# Patient Record
Sex: FEMALE | URBAN - METROPOLITAN AREA
[De-identification: names, ages, dates, MRNs, and addresses within clinical notes are randomized per-mention and may not be internally consistent; named-entity substitution may affect disease eponyms.]

---

## 2022-04-11 ENCOUNTER — HISTORICAL (OUTPATIENT)
Dept: ADMINISTRATIVE | Facility: HOSPITAL | Age: 22
End: 2022-04-11

## 2022-04-29 VITALS
WEIGHT: 187.19 LBS | DIASTOLIC BLOOD PRESSURE: 55 MMHG | HEIGHT: 61 IN | SYSTOLIC BLOOD PRESSURE: 120 MMHG | BODY MASS INDEX: 35.34 KG/M2

## 2025-01-09 ENCOUNTER — HOSPITAL ENCOUNTER (EMERGENCY)
Facility: HOSPITAL | Age: 25
Discharge: HOME | End: 2025-01-09
Attending: EMERGENCY MEDICINE
Payer: MEDICAID

## 2025-01-09 VITALS
DIASTOLIC BLOOD PRESSURE: 95 MMHG | WEIGHT: 234.81 LBS | HEIGHT: 62 IN | RESPIRATION RATE: 18 BRPM | HEART RATE: 118 BPM | OXYGEN SATURATION: 96 % | TEMPERATURE: 98 F | SYSTOLIC BLOOD PRESSURE: 136 MMHG | BODY MASS INDEX: 43.21 KG/M2

## 2025-01-09 DIAGNOSIS — Z76.0 ENCOUNTER FOR MEDICATION REFILL: Primary | ICD-10-CM

## 2025-01-09 PROCEDURE — 99281 EMR DPT VST MAYX REQ PHY/QHP: CPT

## 2025-01-09 RX ORDER — GABAPENTIN 100 MG/1
100 CAPSULE ORAL 3 TIMES DAILY
Qty: 90 CAPSULE | Refills: 0 | Status: SHIPPED | OUTPATIENT
Start: 2025-01-09 | End: 2025-02-03 | Stop reason: SDUPTHER

## 2025-01-09 RX ORDER — QUETIAPINE FUMARATE 100 MG/1
400 TABLET, FILM COATED ORAL NIGHTLY
Qty: 120 TABLET | Refills: 0 | Status: SHIPPED | OUTPATIENT
Start: 2025-01-09 | End: 2025-02-03 | Stop reason: SDUPTHER

## 2025-01-09 RX ORDER — HYDROXYZINE PAMOATE 25 MG/1
25 CAPSULE ORAL EVERY 8 HOURS PRN
Qty: 30 CAPSULE | Refills: 0 | Status: SHIPPED | OUTPATIENT
Start: 2025-01-09 | End: 2025-02-03 | Stop reason: SDUPTHER

## 2025-01-09 RX ORDER — QUETIAPINE FUMARATE 100 MG/1
100 TABLET, FILM COATED ORAL EVERY MORNING
Qty: 30 TABLET | Refills: 0 | Status: SHIPPED | OUTPATIENT
Start: 2025-01-09 | End: 2025-02-08

## 2025-01-09 NOTE — DISCHARGE INSTRUCTIONS
Call internal medicine to schedule an appointment with a primary care provider.  Return if needed.

## 2025-01-09 NOTE — ED PROVIDER NOTES
Encounter Date: 1/9/2025       History     Chief Complaint   Patient presents with    Medication Refill     Recently moved back to [place] from [place], ran out of all meds a week ago. In need of a PCP and refill of medications until she can get to new dr here in [place]     24 year old female presents to the emergency department requesting medication refill.  She states she just moved back to [place] from [place].   Patient requesting referral for PCP and mental health resources.   She denies complaints.   Denies SI/HI.      The history is provided by the patient. No  was used.     Review of patient's allergies indicates:   Allergen Reactions    Palgic d [carbinoxamine-pseudoephedrine] Anaphylaxis    Topamax [topiramate] Anaphylaxis    Macrobid [nitrofurantoin monohyd/m-cryst] Nausea And Vomiting     No past medical history on file.  No past surgical history on file.  No family history on file.  Social History     Tobacco Use    Smoking status: Every Day     Types: Cigarettes, Vaping with nicotine    Smokeless tobacco: Never     Review of Systems   All other systems reviewed and are negative.      Physical Exam     Initial Vitals [01/09/25 1033]   BP Pulse Resp Temp SpO2   (!) 136/95 (!) 118 18 98.2 °F (36.8 °C) 96 %      MAP       --         Physical Exam    Nursing note and vitals reviewed.  Constitutional: She appears well-developed and well-nourished.   Cardiovascular:  Normal rate, regular rhythm, normal heart sounds and intact distal pulses.           Pulmonary/Chest: Breath sounds normal.   Abdominal: Abdomen is soft. Bowel sounds are normal.   Musculoskeletal:         General: Normal range of motion.     Neurological: She is alert.   Skin: Skin is warm.         ED Course   Procedures  Labs Reviewed - No data to display       Imaging Results    None          Medications - No data to display  Medical Decision Making  24 year old female presents to the emergency department  requesting medication refill.  She states she just moved back to [place] from [place].   Patient requesting referral for PCP and mental health resources.   She denies complaints.   Denies SI/HI.      Medication refilled.   Referral sent to internal medicine clinic and mental health resources provided.      Risk  Prescription drug management.               ED Course as of 01/09/25 1057   Thu Jan 09, 2025   1056 The patient is resting comfortably and in no acute distress.   Gave strict ED precautions, discussed specific conditions for return to the emergency department and importance of follow up with her primary care provider.  Patient voices understanding and agrees to the plan discussed. All patients' questions have been answered at this time.   She has remained hemodynamically stable throughout entire stay in ED and is stable for discharge home.  [ER]      ED Course User Index  [ER] Khushboo Bustillo PA                           Clinical Impression:  Final diagnoses:  [Z76.0] Encounter for medication refill (Primary)          ED Disposition Condition    Discharge Stable          ED Prescriptions       Medication Sig Dispense Start Date End Date Auth. Provider    gabapentin (NEURONTIN) 100 MG capsule Take 1 capsule (100 mg total) by mouth 3 (three) times daily. 90 capsule 1/9/2025 2/8/2025 Khushboo Bustillo PA    QUEtiapine (SEROQUEL) 100 MG Tab Take 4 tablets (400 mg total) by mouth every evening. 120 tablet 1/9/2025 2/8/2025 Khushboo Bustillo PA    QUEtiapine (SEROQUEL) 100 MG Tab Take 1 tablet (100 mg total) by mouth every morning. 30 tablet 1/9/2025 2/8/2025 Khushboo Bustillo PA          Follow-up Information       Follow up With Specialties Details Why Contact Info Additional Information    [facility] Emergency [facility] Emergency Medicine  As needed, If symptoms worsen [address] [facility] [address] [phone]     [facility] Internal Medicine Internal Medicine Call    [address] [address] [phone] Internal Medicine Clinic Entrance #1             Khushboo Bustillo PA  01/09/25 1058

## 2025-01-14 ENCOUNTER — OFFICE VISIT (OUTPATIENT)
Dept: INTERNAL MEDICINE | Facility: CLINIC | Age: 25
End: 2025-01-14
Payer: MEDICAID

## 2025-01-14 VITALS
HEART RATE: 101 BPM | WEIGHT: 241.69 LBS | SYSTOLIC BLOOD PRESSURE: 116 MMHG | RESPIRATION RATE: 16 BRPM | BODY MASS INDEX: 44.48 KG/M2 | TEMPERATURE: 98 F | HEIGHT: 62 IN | DIASTOLIC BLOOD PRESSURE: 78 MMHG | OXYGEN SATURATION: 97 %

## 2025-01-14 DIAGNOSIS — F31.9 BIPOLAR AFFECTIVE DISORDER, REMISSION STATUS UNSPECIFIED: ICD-10-CM

## 2025-01-14 DIAGNOSIS — Z76.0 ENCOUNTER FOR MEDICATION REFILL: ICD-10-CM

## 2025-01-14 DIAGNOSIS — Z12.4 CERVICAL CANCER SCREENING: ICD-10-CM

## 2025-01-14 DIAGNOSIS — E11.65 TYPE 2 DIABETES MELLITUS WITH HYPERGLYCEMIA, WITHOUT LONG-TERM CURRENT USE OF INSULIN: Primary | ICD-10-CM

## 2025-01-14 DIAGNOSIS — M79.641 RIGHT HAND PAIN: ICD-10-CM

## 2025-01-14 DIAGNOSIS — Z23 NEED FOR PROPHYLACTIC VACCINATION WITH UNSPECIFIED COMBINED VACCINE: ICD-10-CM

## 2025-01-14 DIAGNOSIS — F19.11 HISTORY OF SUBSTANCE ABUSE: ICD-10-CM

## 2025-01-14 DIAGNOSIS — Z00.00 WELL ADULT EXAM: ICD-10-CM

## 2025-01-14 PROBLEM — F19.10 SUBSTANCE ABUSE: Status: ACTIVE | Noted: 2025-01-14

## 2025-01-14 PROCEDURE — 90471 IMMUNIZATION ADMIN: CPT | Mod: PBBFAC

## 2025-01-14 PROCEDURE — 3078F DIAST BP <80 MM HG: CPT | Mod: CPTII,,,

## 2025-01-14 PROCEDURE — 3074F SYST BP LT 130 MM HG: CPT | Mod: CPTII,,,

## 2025-01-14 PROCEDURE — 90656 IIV3 VACC NO PRSV 0.5 ML IM: CPT | Mod: PBBFAC

## 2025-01-14 PROCEDURE — 90472 IMMUNIZATION ADMIN EACH ADD: CPT | Mod: PBBFAC

## 2025-01-14 PROCEDURE — 90677 PCV20 VACCINE IM: CPT | Mod: PBBFAC

## 2025-01-14 PROCEDURE — 1160F RVW MEDS BY RX/DR IN RCRD: CPT | Mod: CPTII,,,

## 2025-01-14 PROCEDURE — 99215 OFFICE O/P EST HI 40 MIN: CPT | Mod: PBBFAC

## 2025-01-14 PROCEDURE — 1159F MED LIST DOCD IN RCRD: CPT | Mod: CPTII,,,

## 2025-01-14 PROCEDURE — 99204 OFFICE O/P NEW MOD 45 MIN: CPT | Mod: S$PBB,,,

## 2025-01-14 PROCEDURE — 3008F BODY MASS INDEX DOCD: CPT | Mod: CPTII,,,

## 2025-01-14 RX ORDER — METFORMIN HYDROCHLORIDE 500 MG/1
500 TABLET ORAL 2 TIMES DAILY WITH MEALS
Qty: 180 TABLET | Refills: 2 | Status: SHIPPED | OUTPATIENT
Start: 2025-01-14

## 2025-01-14 RX ORDER — INSULIN PUMP SYRINGE, 3 ML
EACH MISCELLANEOUS
Qty: 1 EACH | Refills: 0 | Status: SHIPPED | OUTPATIENT
Start: 2025-01-14 | End: 2026-01-14

## 2025-01-14 RX ADMIN — PNEUMOCOCCAL 20-VALENT CONJUGATE VACCINE 0.5 ML
2.2; 2.2; 2.2; 2.2; 2.2; 2.2; 2.2; 2.2; 2.2; 2.2; 2.2; 2.2; 2.2; 2.2; 2.2; 2.2; 4.4; 2.2; 2.2; 2.2 INJECTION, SUSPENSION INTRAMUSCULAR at 07:01

## 2025-01-14 RX ADMIN — INFLUENZA VIRUS VACCINE 0.5 ML: 15; 15; 15 SUSPENSION INTRAMUSCULAR at 07:01

## 2025-01-14 NOTE — ASSESSMENT & PLAN NOTE
Resume metformin 500mg BID  Referral to nutrition services  A1C ordered  Follow ADA Diet. Avoid soda, simple sweets, and limit rice/pasta/breads/starches (no more than 45-50 grams per meal).  Maintain healthy weight with goal BMI <30.  Exercise 5 times per week for 30 minutes per day.  Stressed importance of daily foot exams.  Stressed importance of annual dilated eye exam.

## 2025-01-14 NOTE — ASSESSMENT & PLAN NOTE
Referral to [facility] for psychiatric management  Read positive daily meditations, avoid negative media, set healthy boundaries.  Exercise daily, keep consistent sleep pattern, eat a healthy diet.  Establish good social support, make changes to reduce stress.  Do not drink alcohol or use illicit drugs.  Reports any symptoms of suicidal/homicidal ideations or self harm immediately, go to nearest emergency room.

## 2025-01-14 NOTE — PROGRESS NOTES
SALVADOR Modi   [facility]  [address] [facility] [address]      PATIENT NAME: Hank Napier  : 2000  DATE: 23  MRN: [id number]      Patient PCP Information       Provider PCP Type    SALVADOR Modi [facility]            Reason for Visit / Chief Complaint: [facility] (Moved from [place], right hand pain (previous fracture to right wrist))       History of Present Illness / Problem Focused Workflow     Hank Napier presents to the clinic with [facility] (Moved from [place], right hand pain (previous fracture to right wrist))     23 yo WF presents to clinic. Medical problems DM, depression, anxiety, bipolar, substance abuse (meth, heroin, opiates), borderline personality disorder.     23  Pt presents to [facility] primary care. She recently moved back here from [place]. She was diagnosed with DM about 2 years ago, she was previously on metformin max dose but discontinued it due to nausea 2-3 months ago, she tolerated lower doses fine. Agreeable to resume metformin 500 BID for now. She is also looking for psychiatric provider, she was previously on suboxone, has been off for 3 months and is interested in resuming. She is reporting she has not had a period in years, 2 weeks ago she started with vaginal bleeding. She is complaining of right hand a wrist pain for about 4 years which she believes is from an old injury. Denies other complaints, reviewed plan of care with pt. She is agreeable to RTC 1 month with labs prior.           Review of Systems     Review of Systems   Constitutional:  Negative for fatigue, fever and unexpected weight change.   HENT:  Negative for ear pain, hearing loss, trouble swallowing and voice change.    Respiratory:  Negative for cough and shortness of breath.    Cardiovascular:  Negative for chest pain and palpitations.   Gastrointestinal:  Negative for abdominal pain, diarrhea and  "vomiting.   Genitourinary:  Negative for dysuria.   Musculoskeletal:  Positive for arthralgias. Negative for gait problem.   Skin:  Negative for rash and wound.   Neurological:  Negative for weakness.   Psychiatric/Behavioral:  Negative for suicidal ideas.          Medications and Allergies     Medications  Current Outpatient Medications   Medication Instructions    blood-glucose meter kit Use as instructed    gabapentin (NEURONTIN) 100 mg, Oral, 3 times daily    hydrOXYzine pamoate (VISTARIL) 25 mg, Oral, Every 8 hours PRN    metFORMIN (GLUCOPHAGE) 500 mg, Oral, 2 times daily with meals    QUEtiapine (SEROQUEL) 400 mg, Oral, Nightly    QUEtiapine (SEROQUEL) 100 mg, Oral, Every morning         Allergies  Review of patient's allergies indicates:   Allergen Reactions    Palgic d [carbinoxamine-pseudoephedrine] Anaphylaxis    Topamax [topiramate] Anaphylaxis    Macrobid [nitrofurantoin monohyd/m-cryst] Nausea And Vomiting       Physical Examination     Visit Vitals  /78 (BP Location: Right arm, Patient Position: Sitting)   Pulse 101   Temp 97.5 °F (36.4 °C) (Oral)   Resp 16   Ht 5' 2" (1.575 m)   Wt 109.6 kg (241 lb 11.2 oz)   LMP 01/08/2025 (Approximate)   SpO2 97%   BMI 44.21 kg/m²       Physical Exam  Vitals and nursing note reviewed.   Constitutional:       General: She is not in acute distress.     Appearance: She is not ill-appearing.   HENT:      Head: Normocephalic and atraumatic.      Mouth/Throat:      Mouth: Mucous membranes are moist.      Pharynx: Oropharynx is clear.   Eyes:      General: No scleral icterus.     Extraocular Movements: Extraocular movements intact.      Conjunctiva/sclera: Conjunctivae normal.      Pupils: Pupils are equal, round, and reactive to light.   Neck:      Vascular: No carotid bruit.   Cardiovascular:      Rate and Rhythm: Normal rate and regular rhythm.      Heart sounds: No murmur heard.     No friction rub. No gallop.   Pulmonary:      Effort: Pulmonary effort is normal. " No respiratory distress.      Breath sounds: Normal breath sounds. No wheezing, rhonchi or rales.   Abdominal:      General: Abdomen is flat. Bowel sounds are normal. There is no distension.      Palpations: Abdomen is soft. There is no mass.      Tenderness: There is no abdominal tenderness.   Musculoskeletal:         General: Normal range of motion.      Cervical back: Normal range of motion and neck supple.   Skin:     General: Skin is warm and dry.   Neurological:      General: No focal deficit present.      Mental Status: She is alert.   Psychiatric:         Mood and Affect: Mood normal.           Results     none      Assessment        ICD-10-CM ICD-9-CM   1. Type 2 diabetes mellitus with hyperglycemia, without long-term current use of insulin  E11.65 250.00     790.29   2. Bipolar affective disorder, remission status unspecified  F31.9 296.80   3. History of substance abuse  F19.11 305.93   4. Cervical cancer screening  Z12.4 V76.2   5. Right hand pain  M79.641 729.5   6. Need for prophylactic vaccination with unspecified combined vaccine  Z23 V06.9   7. Well adult exam  Z00.00 V70.0        Plan      Problem List Items Addressed This Visit       Right hand pain    Current Assessment & Plan     Xrays today  May take OTC ibuprofen PRN         Relevant Orders    X-Ray Hand 3 view Right    X-Ray Wrist 2 View Right    Substance abuse    Type 2 diabetes mellitus with hyperglycemia, without long-term current use of insulin - Primary    Current Assessment & Plan     Resume metformin 500mg BID  Referral to nutrition services  A1C ordered  Follow ADA Diet. Avoid soda, simple sweets, and limit rice/pasta/breads/starches (no more than 45-50 grams per meal).  Maintain healthy weight with goal BMI <30.  Exercise 5 times per week for 30 minutes per day.  Stressed importance of daily foot exams.  Stressed importance of annual dilated eye exam.           Relevant Medications    blood-glucose meter kit    metFORMIN  (GLUCOPHAGE) 500 MG tablet    Other Relevant Orders    Comprehensive Metabolic Panel    Hemoglobin A1C    Ambulatory referral/consult to Nutrition Services    Bipolar disorder    Current Assessment & Plan     Referral to [facility] for psychiatric management  Read positive daily meditations, avoid negative media, set healthy boundaries.  Exercise daily, keep consistent sleep pattern, eat a healthy diet.  Establish good social support, make changes to reduce stress.  Do not drink alcohol or use illicit drugs.  Reports any symptoms of suicidal/homicidal ideations or self harm immediately, go to nearest emergency room.           Relevant Orders    Ambulatory referral/consult to Psychiatry     Other Visit Diagnoses       Cervical cancer screening        Relevant Orders    Ambulatory referral/consult to Gynecology    Need for prophylactic vaccination with unspecified combined vaccine        Relevant Medications    pneumoc 20-lisa conj-dip cr(PF) (PREVNAR-20 (PF)) injection Syrg 0.5 mL (Completed) (Start on 1/14/2025  8:46 AM)    influenza (Flulaval, Fluzone, Fluarix) 45 mcg/0.5 mL IM vaccine (> or = 6 mo) 0.5 mL (Completed)    Well adult exam        Relevant Orders    CBC Auto Differential    Comprehensive Metabolic Panel    Hemoglobin A1C    Lipid Panel    TSH    T4, Free    Vitamin D    Chlamydia/GC, PCR    Hepatitis Panel, Acute    HIV 1/2 Ag/Ab (4th Gen)    SYPHILIS ANTIBODY (WITH REFLEX RPR)    Urinalysis, Reflex to Urine Culture            Future Appointments   Date Time Provider Department Center   2/11/2025  8:40 AM Rea Mcpherson, [facility]        Follow up in about 4 weeks (around 2/11/2025) for Wellness, With labwork prior to visit.      Signature:      [facility]  [address] [address]    Date of encounter: 1/14/25

## 2025-01-16 ENCOUNTER — TELEPHONE (OUTPATIENT)
Dept: INTERNAL MEDICINE | Facility: CLINIC | Age: 25
End: 2025-01-16
Payer: MEDICAID

## 2025-01-16 DIAGNOSIS — F19.10 SUBSTANCE ABUSE: ICD-10-CM

## 2025-01-16 DIAGNOSIS — F31.9 BIPOLAR AFFECTIVE DISORDER, REMISSION STATUS UNSPECIFIED: Primary | ICD-10-CM

## 2025-01-16 NOTE — TELEPHONE ENCOUNTER
----- Message from Nurse Gleason sent at 1/16/2025  9:05 AM CST -----  [facility] does not accept pt insurance  ----- Message -----  From: Misti Palacios  Sent: 1/15/2025   4:06 PM CST  To: [place] Lucia Staff    Who Called: [facility]    Caller is requesting assistance/information from provider's office.    Symptoms (please be specific):    How long has patient had these symptoms:    List of preferred pharmacies on file (remove unneeded): [unfilled]  If different, enter pharmacy into here including location and phone number:       Preferred Method of Contact: Phone Call  Patient's Preferred Phone Number on File: [phone]   Best Call Back Number, if different:  Additional Information: Office states they do not accept pt's insurance.

## 2025-01-16 NOTE — TELEPHONE ENCOUNTER
Info given to pt. When I spoke to her she said the gyno that we referred her to can't see her til November. She asked if there is someone else we can refer her to?

## 2025-01-16 NOTE — TELEPHONE ENCOUNTER
Please call pt and inform her of this and that I have referred her to another clinic called [facility].     Thanks

## 2025-02-03 DIAGNOSIS — M79.641 RIGHT HAND PAIN: Primary | ICD-10-CM

## 2025-02-03 DIAGNOSIS — F31.9 BIPOLAR AFFECTIVE DISORDER, REMISSION STATUS UNSPECIFIED: ICD-10-CM

## 2025-02-03 RX ORDER — GABAPENTIN 100 MG/1
100 CAPSULE ORAL 3 TIMES DAILY
Qty: 90 CAPSULE | Refills: 0 | Status: SHIPPED | OUTPATIENT
Start: 2025-02-03 | End: 2025-03-05

## 2025-02-03 RX ORDER — HYDROXYZINE PAMOATE 25 MG/1
25 CAPSULE ORAL EVERY 8 HOURS PRN
Qty: 30 CAPSULE | Refills: 0 | Status: SHIPPED | OUTPATIENT
Start: 2025-02-03

## 2025-02-03 RX ORDER — QUETIAPINE FUMARATE 100 MG/1
400 TABLET, FILM COATED ORAL NIGHTLY
Qty: 120 TABLET | Refills: 0 | Status: SHIPPED | OUTPATIENT
Start: 2025-02-03 | End: 2025-03-05

## 2025-02-03 NOTE — TELEPHONE ENCOUNTER
----- Message from Amanda sent at 2/3/2025 12:40 PM CST -----  Who Called: Hank Napier    Refill or New Rx:Refill  RX Name and Strength:QUEtiapine (SEROQUEL) 100 MG Tab   How is the patient currently taking it? (ex. 1XDay):2 xday 100mg @ day and 400mg @ night  Is this a 30 day or 90 day RX:120 tablet    RX Name and Strength:gabapentin (NEURONTIN) 100 MG capsule   How is the patient currently taking it? (ex. 1XDay):3 xday  Is this a 30 day or 90 day RX:90 capsule    RX Name and Strength:hydrOXYzine pamoate (VISTARIL) 25 MG Cap   How is the patient currently taking it? (ex. 1XDay):1 xday as needed  Is this a 30 day or 90 day RX:30 capsule    Local or Mail Order:local  List of preferred pharmacies on file (remove unneeded): [unfilled]  If different Pharmacy is requested, enter Pharmacy information here including location and phone number: [facility]/pharmacy #[id number] - [facility] [address] AT [address] [facility]   Phone: [phone]  Fax: [phone]    Ordering Provider:miranda      Preferred Method of Contact: Phone Call  Patient's Preferred Phone Number on File: [phone]   Best Call Back Number, if different:  Additional Information: refill request, pt called and stated pharmacy needs approval to refill, please advise. thanks

## 2025-02-10 ENCOUNTER — LAB VISIT (OUTPATIENT)
Dept: LAB | Facility: HOSPITAL | Age: 25
End: 2025-02-10
Payer: MEDICAID

## 2025-02-10 DIAGNOSIS — Z00.00 WELL ADULT EXAM: ICD-10-CM

## 2025-02-10 DIAGNOSIS — E11.65 TYPE 2 DIABETES MELLITUS WITH HYPERGLYCEMIA, WITHOUT LONG-TERM CURRENT USE OF INSULIN: ICD-10-CM

## 2025-02-10 LAB
25(OH)D3+25(OH)D2 SERPL-MCNC: 22 NG/ML (ref 30–80)
ALBUMIN SERPL-MCNC: 3.9 G/DL (ref 3.5–5)
ALBUMIN/GLOB SERPL: 1.1 RATIO (ref 1.1–2)
ALP SERPL-CCNC: 64 UNIT/L (ref 40–150)
ALT SERPL-CCNC: 30 UNIT/L (ref 0–55)
ANION GAP SERPL CALC-SCNC: 7 MEQ/L
AST SERPL-CCNC: 19 UNIT/L (ref 5–34)
BACTERIA #/AREA URNS AUTO: ABNORMAL /HPF
BASOPHILS # BLD AUTO: 0.03 X10(3)/MCL
BASOPHILS NFR BLD AUTO: 0.3 %
BILIRUB SERPL-MCNC: 0.3 MG/DL
BILIRUB UR QL STRIP.AUTO: NEGATIVE
BUN SERPL-MCNC: 10.8 MG/DL (ref 7–18.7)
C TRACH DNA SPEC QL NAA+PROBE: NOT DETECTED
CALCIUM SERPL-MCNC: 10 MG/DL (ref 8.4–10.2)
CHLORIDE SERPL-SCNC: 109 MMOL/L (ref 98–107)
CHOLEST SERPL-MCNC: 138 MG/DL
CHOLEST/HDLC SERPL: 5 {RATIO} (ref 0–5)
CLARITY UR: CLEAR
CO2 SERPL-SCNC: 24 MMOL/L (ref 22–29)
COLOR UR AUTO: YELLOW
CREAT SERPL-MCNC: 0.64 MG/DL (ref 0.55–1.02)
CREAT/UREA NIT SERPL: 17
EOSINOPHIL # BLD AUTO: 0.23 X10(3)/MCL (ref 0–0.9)
EOSINOPHIL NFR BLD AUTO: 2.6 %
ERYTHROCYTE [DISTWIDTH] IN BLOOD BY AUTOMATED COUNT: 12.6 % (ref 11.5–17)
EST. AVERAGE GLUCOSE BLD GHB EST-MCNC: 128.4 MG/DL
GFR SERPLBLD CREATININE-BSD FMLA CKD-EPI: >60 ML/MIN/1.73/M2
GLOBULIN SER-MCNC: 3.7 GM/DL (ref 2.4–3.5)
GLUCOSE SERPL-MCNC: 116 MG/DL (ref 74–100)
GLUCOSE UR QL STRIP: NORMAL
HAV IGM SERPL QL IA: NONREACTIVE
HBA1C MFR BLD: 6.1 %
HBV CORE IGM SERPL QL IA: NONREACTIVE
HBV SURFACE AG SERPL QL IA: NONREACTIVE
HCT VFR BLD AUTO: 39.6 % (ref 37–47)
HCV AB SERPL QL IA: NONREACTIVE
HDLC SERPL-MCNC: 30 MG/DL (ref 35–60)
HGB BLD-MCNC: 12.9 G/DL (ref 12–16)
HGB UR QL STRIP: NEGATIVE
HIV 1+2 AB+HIV1 P24 AG SERPL QL IA: NONREACTIVE
HYALINE CASTS #/AREA URNS LPF: ABNORMAL /LPF
IMM GRANULOCYTES # BLD AUTO: 0.03 X10(3)/MCL (ref 0–0.04)
IMM GRANULOCYTES NFR BLD AUTO: 0.3 %
KETONES UR QL STRIP: ABNORMAL
LDLC SERPL CALC-MCNC: 89 MG/DL (ref 50–140)
LEUKOCYTE ESTERASE UR QL STRIP: 250
LYMPHOCYTES # BLD AUTO: 3.52 X10(3)/MCL (ref 0.6–4.6)
LYMPHOCYTES NFR BLD AUTO: 39.4 %
MCH RBC QN AUTO: 29.2 PG (ref 27–31)
MCHC RBC AUTO-ENTMCNC: 32.6 G/DL (ref 33–36)
MCV RBC AUTO: 89.6 FL (ref 80–94)
MONOCYTES # BLD AUTO: 0.46 X10(3)/MCL (ref 0.1–1.3)
MONOCYTES NFR BLD AUTO: 5.1 %
MUCOUS THREADS URNS QL MICRO: ABNORMAL /LPF
N GONORRHOEA DNA SPEC QL NAA+PROBE: NOT DETECTED
NEUTROPHILS # BLD AUTO: 4.67 X10(3)/MCL (ref 2.1–9.2)
NEUTROPHILS NFR BLD AUTO: 52.3 %
NITRITE UR QL STRIP: NEGATIVE
NRBC BLD AUTO-RTO: 0 %
PH UR STRIP: 7 [PH]
PLATELET # BLD AUTO: 294 X10(3)/MCL (ref 130–400)
PMV BLD AUTO: 9.3 FL (ref 7.4–10.4)
POTASSIUM SERPL-SCNC: 4.1 MMOL/L (ref 3.5–5.1)
PROT SERPL-MCNC: 7.6 GM/DL (ref 6.4–8.3)
PROT UR QL STRIP: ABNORMAL
RBC # BLD AUTO: 4.42 X10(6)/MCL (ref 4.2–5.4)
RBC #/AREA URNS AUTO: ABNORMAL /HPF
SODIUM SERPL-SCNC: 140 MMOL/L (ref 136–145)
SOURCE (OHS): NORMAL
SP GR UR STRIP.AUTO: 1.03 (ref 1–1.03)
SQUAMOUS #/AREA URNS LPF: ABNORMAL /HPF
T PALLIDUM AB SER QL: NONREACTIVE
T4 FREE SERPL-MCNC: 0.88 NG/DL (ref 0.7–1.48)
TRIGL SERPL-MCNC: 97 MG/DL (ref 37–140)
TSH SERPL-ACNC: 0.56 UIU/ML (ref 0.35–4.94)
UROBILINOGEN UR STRIP-ACNC: ABNORMAL
VLDLC SERPL CALC-MCNC: 19 MG/DL
WBC # BLD AUTO: 8.94 X10(3)/MCL (ref 4.5–11.5)
WBC #/AREA URNS AUTO: ABNORMAL /HPF

## 2025-02-10 PROCEDURE — 80053 COMPREHEN METABOLIC PANEL: CPT

## 2025-02-10 PROCEDURE — 85025 COMPLETE CBC W/AUTO DIFF WBC: CPT

## 2025-02-10 PROCEDURE — 84443 ASSAY THYROID STIM HORMONE: CPT

## 2025-02-10 PROCEDURE — 87491 CHLMYD TRACH DNA AMP PROBE: CPT

## 2025-02-10 PROCEDURE — 80074 ACUTE HEPATITIS PANEL: CPT

## 2025-02-10 PROCEDURE — 83036 HEMOGLOBIN GLYCOSYLATED A1C: CPT

## 2025-02-10 PROCEDURE — 81015 MICROSCOPIC EXAM OF URINE: CPT

## 2025-02-10 PROCEDURE — 84439 ASSAY OF FREE THYROXINE: CPT

## 2025-02-10 PROCEDURE — 86780 TREPONEMA PALLIDUM: CPT

## 2025-02-10 PROCEDURE — 82306 VITAMIN D 25 HYDROXY: CPT

## 2025-02-10 PROCEDURE — 36415 COLL VENOUS BLD VENIPUNCTURE: CPT

## 2025-02-10 PROCEDURE — 87389 HIV-1 AG W/HIV-1&-2 AB AG IA: CPT

## 2025-02-10 PROCEDURE — 80061 LIPID PANEL: CPT

## 2025-02-10 NOTE — PROGRESS NOTES
Labs reviewed, will discuss results with patient at their upcoming appointment.     SALVADOR Andersen

## 2025-02-11 ENCOUNTER — OFFICE VISIT (OUTPATIENT)
Dept: INTERNAL MEDICINE | Facility: CLINIC | Age: 25
End: 2025-02-11
Payer: MEDICAID

## 2025-02-11 VITALS
RESPIRATION RATE: 16 BRPM | BODY MASS INDEX: 44.01 KG/M2 | OXYGEN SATURATION: 98 % | SYSTOLIC BLOOD PRESSURE: 126 MMHG | WEIGHT: 239.19 LBS | TEMPERATURE: 98 F | HEIGHT: 62 IN | HEART RATE: 88 BPM | DIASTOLIC BLOOD PRESSURE: 84 MMHG

## 2025-02-11 DIAGNOSIS — E11.65 TYPE 2 DIABETES MELLITUS WITH HYPERGLYCEMIA, WITHOUT LONG-TERM CURRENT USE OF INSULIN: ICD-10-CM

## 2025-02-11 DIAGNOSIS — E55.9 VITAMIN D DEFICIENCY: ICD-10-CM

## 2025-02-11 DIAGNOSIS — G43.109 MIGRAINE WITH AURA AND WITHOUT STATUS MIGRAINOSUS, NOT INTRACTABLE: ICD-10-CM

## 2025-02-11 DIAGNOSIS — R80.9 PROTEINURIA, UNSPECIFIED TYPE: ICD-10-CM

## 2025-02-11 DIAGNOSIS — Z00.00 WELL ADULT EXAM: Primary | ICD-10-CM

## 2025-02-11 PROCEDURE — 3074F SYST BP LT 130 MM HG: CPT | Mod: CPTII,,,

## 2025-02-11 PROCEDURE — 1160F RVW MEDS BY RX/DR IN RCRD: CPT | Mod: CPTII,,,

## 2025-02-11 PROCEDURE — 99214 OFFICE O/P EST MOD 30 MIN: CPT | Mod: 25,S$PBB,,

## 2025-02-11 PROCEDURE — 3044F HG A1C LEVEL LT 7.0%: CPT | Mod: CPTII,,,

## 2025-02-11 PROCEDURE — 99395 PREV VISIT EST AGE 18-39: CPT | Mod: S$PBB,,,

## 2025-02-11 PROCEDURE — 1159F MED LIST DOCD IN RCRD: CPT | Mod: CPTII,,,

## 2025-02-11 PROCEDURE — 3008F BODY MASS INDEX DOCD: CPT | Mod: CPTII,,,

## 2025-02-11 PROCEDURE — 3079F DIAST BP 80-89 MM HG: CPT | Mod: CPTII,,,

## 2025-02-11 PROCEDURE — 99214 OFFICE O/P EST MOD 30 MIN: CPT | Mod: PBBFAC

## 2025-02-11 RX ORDER — SUMATRIPTAN SUCCINATE 25 MG/1
50 TABLET ORAL DAILY PRN
Qty: 10 TABLET | Refills: 0 | Status: SHIPPED | OUTPATIENT
Start: 2025-02-11 | End: 2025-03-13

## 2025-02-11 NOTE — ASSESSMENT & PLAN NOTE
Educated on increasing foods high in Vitamin D such as fish oil, cod liver oil, salmon, milk fortified with vitamin D.  RX Vitamin D3 02780 IU weekly x 12 weeks.  Complete entire 12 weeks of Vitamin D prescription.  After completion of prescription (12 weeks/3 months), begin taking Vitamin D 2000 I.U. tablets daily (purchase over the counter).  Repeat Vitamin D level as ordered.    Lab Results   Component Value Date    [id number] 22 (L) 02/10/2025

## 2025-02-11 NOTE — PROGRESS NOTES
Rea Mcpherson, SALVADOR   [facility]  [address] [facility] [address]      PATIENT NAME: Hank Napier  : 2000  DATE: 25  MRN: [id number]      Reason for Visit / Chief Complaint: Follow-up (Labs completed, migraine )       History of Present Illness / Problem Focused Workflow     Hank Napier presents to the clinic with Follow-up (Labs completed, migraine )     25 yo WF presents to clinic. Medical problems DM, depression, anxiety, bipolar, substance abuse (meth, heroin, opiates), borderline personality disorder. Established with [facility].     25  Pt presents to establish primary care. She recently moved back here from [place]. She was diagnosed with DM about 2 years ago, she was previously on metformin max dose but discontinued it due to nausea 2-3 months ago, she tolerated lower doses fine. Agreeable to resume metformin 500 BID for now. She is also looking for psychiatric provider, she was previously on suboxone, has been off for 3 months and is interested in resuming. She is reporting she has not had a period in years, 2 weeks ago she started with vaginal bleeding. She is complaining of right hand a wrist pain for about 4 years which she believes is from an old injury. Denies other complaints, reviewed plan of care with pt. She is agreeable to RTC 1 month with labs prior.     25  Pt presents for wellness and lab review. A1C at goal 6.1. she reports she has been compliance with metformin 500mg BID. Vitamin D deficiency noted, she's agreeable to rx followed by OTC supplement. Reviewed protein present in urine, reinforced hydration and continued glycemic control. Will repeat in 6 months. She is complaining of migraine for the last few days, she has been on topamax in the past, unsure if she has tried other meds. Agreeable to try sumatriptan PRN. RTC in 6 months for DM and proteinuria f/u with labs or  PRN          Review of Systems     Review of Systems   Constitutional:  Negative for fatigue, fever and unexpected weight change.   HENT:  Negative for ear pain, hearing loss, trouble swallowing and voice change.    Respiratory:  Negative for cough and shortness of breath.    Cardiovascular:  Negative for chest pain and palpitations.   Gastrointestinal:  Negative for abdominal pain, diarrhea and vomiting.   Genitourinary:  Negative for dysuria.   Musculoskeletal:  Negative for gait problem.   Skin:  Negative for rash and wound.   Neurological:  Positive for headaches. Negative for weakness.   Psychiatric/Behavioral:  Negative for suicidal ideas.          Medications and Allergies     Medications  Medication List with Changes/Refills   New Medications    SUMATRIPTAN (IMITREX) 25 MG TAB    Take 2 tablets (50 mg total) by mouth daily as needed (migraine).   Current Medications    BLOOD-GLUCOSE METER KIT    Use as instructed    GABAPENTIN (NEURONTIN) 100 MG CAPSULE    Take 1 capsule (100 mg total) by mouth 3 (three) times daily.    HYDROXYZINE PAMOATE (VISTARIL) 25 MG CAP    Take 1 capsule (25 mg total) by mouth every 8 (eight) hours as needed (anxiety).    METFORMIN (GLUCOPHAGE) 500 MG TABLET    Take 1 tablet (500 mg total) by mouth 2 (two) times daily with meals.    QUETIAPINE (SEROQUEL) 100 MG TAB    Take 1 tablet (100 mg total) by mouth every morning.    QUETIAPINE (SEROQUEL) 100 MG TAB    Take 4 tablets (400 mg total) by mouth every evening.         Allergies  Review of patient's allergies indicates:   Allergen Reactions    Palgic d [carbinoxamine-pseudoephedrine] Anaphylaxis    Topamax [topiramate] Anaphylaxis    Macrobid [nitrofurantoin monohyd/m-cryst] Nausea And Vomiting       Physical Examination     Vitals:    02/11/25 0851   BP: 126/84   Pulse: 88   Resp: 16   Temp: 97.9 °F (36.6 °C)     Physical Exam  Vitals and nursing note reviewed.   Constitutional:       General: She is not in acute distress.      Appearance: She is not ill-appearing.   HENT:      Head: Normocephalic and atraumatic.      Mouth/Throat:      Mouth: Mucous membranes are moist.      Pharynx: Oropharynx is clear.   Eyes:      General: No scleral icterus.     Extraocular Movements: Extraocular movements intact.      Conjunctiva/sclera: Conjunctivae normal.      Pupils: Pupils are equal, round, and reactive to light.   Neck:      Vascular: No carotid bruit.   Cardiovascular:      Rate and Rhythm: Normal rate and regular rhythm.      Heart sounds: No murmur heard.     No friction rub. No gallop.   Pulmonary:      Effort: Pulmonary effort is normal. No respiratory distress.      Breath sounds: Normal breath sounds. No wheezing, rhonchi or rales.   Abdominal:      General: Abdomen is flat. Bowel sounds are normal. There is no distension.      Palpations: Abdomen is soft. There is no mass.      Tenderness: There is no abdominal tenderness.   Musculoskeletal:         General: Normal range of motion.      Cervical back: Normal range of motion and neck supple.   Skin:     General: Skin is warm and dry.   Neurological:      General: No focal deficit present.      Mental Status: She is alert.   Psychiatric:         Mood and Affect: Mood normal.           Results     Lab Results   Component Value Date    WBC 8.94 02/10/2025    RBC 4.42 02/10/2025    HGB 12.9 02/10/2025    HCT 39.6 02/10/2025    MCV 89.6 02/10/2025    MCH 29.2 02/10/2025    MCHC 32.6 (L) 02/10/2025    RDW 12.6 02/10/2025     02/10/2025    MPV 9.3 02/10/2025     Sodium   Date Value Ref Range Status   02/10/2025 140 136 - 145 mmol/L Final     Potassium   Date Value Ref Range Status   02/10/2025 4.1 3.5 - 5.1 mmol/L Final     Chloride   Date Value Ref Range Status   02/10/2025 109 (H) 98 - 107 mmol/L Final     CO2   Date Value Ref Range Status   02/10/2025 24 22 - 29 mmol/L Final     Blood Urea Nitrogen   Date Value Ref Range Status   02/10/2025 10.8 7.0 - 18.7 mg/dL Final     Creatinine    Date Value Ref Range Status   02/10/2025 0.64 0.55 - 1.02 mg/dL Final     Calcium   Date Value Ref Range Status   02/10/2025 10.0 8.4 - 10.2 mg/dL Final     Albumin   Date Value Ref Range Status   02/10/2025 3.9 3.5 - 5.0 g/dL Final     Bilirubin Total   Date Value Ref Range Status   02/10/2025 0.3 <=1.5 mg/dL Final     ALP   Date Value Ref Range Status   02/10/2025 64 40 - 150 unit/L Final     AST   Date Value Ref Range Status   02/10/2025 19 5 - 34 unit/L Final     ALT   Date Value Ref Range Status   02/10/2025 30 0 - 55 unit/L Final     Estimated GFR-Non    Date Value Ref Range Status   11/14/2017 >60 mL/min/1.73 m2 Final     Lab Results   Component Value Date    CHOL 138 02/10/2025     Lab Results   Component Value Date    HDL 30 (L) 02/10/2025     Lab Results   Component Value Date    TRIG 97 02/10/2025     Lab Results   Component Value Date    VLDL 19 02/10/2025     Lab Results   Component Value Date    LDL 89.00 02/10/2025     Lab Results   Component Value Date    TSH 0.555 02/10/2025     Lab Results   Component Value Date    PHUR 7.0 02/10/2025    PROTEINUA 1+ (A) 02/10/2025    GLUCUA Normal 02/10/2025    KETONESU Negative 11/14/2017    OCCULTUA Negative 02/10/2025    NITRITE Negative 02/10/2025    LEUKOCYTESUR 250 (A) 02/10/2025     Lab Results   Component Value Date    HGBA1C 6.1 02/10/2025           Assessment         ICD-10-CM ICD-9-CM   1. Well adult exam  Z00.00 V70.0   2. Type 2 diabetes mellitus with hyperglycemia, without long-term current use of insulin  E11.65 250.00     790.29   3. Vitamin D deficiency  E55.9 268.9   4. Proteinuria, unspecified type  R80.9 791.0   5. Migraine with aura and without status migrainosus, not intractable  G43.109 346.00       Plan      Problem List Items Addressed This Visit       Type 2 diabetes mellitus with hyperglycemia, without long-term current use of insulin    Current Assessment & Plan     Continue metformin 500mg BID  A1C 6.1- at  goal  Follow ADA Diet. Avoid soda, simple sweets, and limit rice/pasta/breads/starches (no more than 45-50 grams per meal).  Maintain healthy weight with goal BMI <30.  Exercise 5 times per week for 30 minutes per day.  Stressed importance of daily foot exams.  Stressed importance of annual dilated eye exam.           Relevant Orders    Hemoglobin A1C    Comprehensive Metabolic Panel    Microalbumin/Creatinine Ratio, Urine    Urinalysis, Reflex to Urine Culture    Vitamin D deficiency    Current Assessment & Plan     Educated on increasing foods high in Vitamin D such as fish oil, cod liver oil, salmon, milk fortified with vitamin D.  RX Vitamin D3 32894 IU weekly x 12 weeks.  Complete entire 12 weeks of Vitamin D prescription.  After completion of prescription (12 weeks/3 months), begin taking Vitamin D 2000 I.U. tablets daily (purchase over the counter).  Repeat Vitamin D level as ordered.    Lab Results   Component Value Date    [id number] 22 (L) 02/10/2025              Relevant Orders    Vitamin D    Proteinuria    Current Assessment & Plan     Educated on renal protective measures  Will repeat in 6 months  Will consider low dose ACEI if persistent         Relevant Orders    Microalbumin/Creatinine Ratio, Urine    Urinalysis, Reflex to Urine Culture    Migraine with aura and without status migrainosus, not intractable    Current Assessment & Plan     Sumatriptan PRN  Avoid triggers such as bright lights, alcohol, nicotine, aged cheeses, chocolate, caffeine, foods/drinks that contain nitrates or aspartame.  Take meds as prescribed.  Lie in a quiet, dark room if possible.  Limit stress and get at least 8 hours of sleep per night.            Relevant Medications    sumatriptan (IMITREX) 25 MG Tab    Well adult exam - Primary    Current Assessment & Plan     Pt wellness visit completed today with appropriate lab work.    Topics Reviewed / Updated- scheduled for pap Nov 2025  Immunizations Discussed  Dicussed  Healthy Diet &   Encouraged to exercise 3 x weekly  Increase Water Intake  Eat more fruits and vegetables  Avoid soda & alcohol              Future Appointments   Date Time Provider Department Center   4/2/2025  9:00 AM DIETITIAN, EBER NUTRITION [facility]   9/25/2025  2:40 PM Rea Mcpherson FNP [facility]   11/3/2025  9:00 AM RESIDENTS, [facility]            Signature:      [facility]  [address] [facility] [address]    Date of encounter: 2/11/25

## 2025-02-11 NOTE — ASSESSMENT & PLAN NOTE
Educated on renal protective measures  Will repeat in 6 months  Will consider low dose ACEI if persistent

## 2025-02-11 NOTE — ASSESSMENT & PLAN NOTE
Continue metformin 500mg BID  A1C 6.1- at goal  Follow ADA Diet. Avoid soda, simple sweets, and limit rice/pasta/breads/starches (no more than 45-50 grams per meal).  Maintain healthy weight with goal BMI <30.  Exercise 5 times per week for 30 minutes per day.  Stressed importance of daily foot exams.  Stressed importance of annual dilated eye exam.

## 2025-02-11 NOTE — ASSESSMENT & PLAN NOTE
Pt wellness visit completed today with appropriate lab work.   HM Topics Reviewed / Updated- scheduled for pap Nov 2025  Immunizations Discussed  Dicussed Healthy Diet &   Encouraged to exercise 3 x weekly  Increase Water Intake  Eat more fruits and vegetables  Avoid soda & alcohol

## 2025-02-11 NOTE — ASSESSMENT & PLAN NOTE
Sumatriptan PRN  Avoid triggers such as bright lights, alcohol, nicotine, aged cheeses, chocolate, caffeine, foods/drinks that contain nitrates or aspartame.  Take meds as prescribed.  Lie in a quiet, dark room if possible.  Limit stress and get at least 8 hours of sleep per night.

## 2025-02-13 ENCOUNTER — TELEPHONE (OUTPATIENT)
Dept: INTERNAL MEDICINE | Facility: CLINIC | Age: 25
End: 2025-02-13
Payer: MEDICAID

## 2025-02-13 NOTE — TELEPHONE ENCOUNTER
----- Message from Veronica Goins sent at 2/13/2025  1:57 PM CST -----  Who Called: Hank Napier    Patient is returning phone call    Who Left Message for Patient:N/A  Does the patient know what this is regarding?:N/A      Preferred Method of Contact: Phone Call  Patient's Preferred Phone Number on File: [phone]   Best Call Back Number, if different:  Additional Information: Pt stated she was on the line w/ clinic and call got dropped

## 2025-02-20 ENCOUNTER — HOSPITAL ENCOUNTER (EMERGENCY)
Facility: HOSPITAL | Age: 25
Discharge: HOME | End: 2025-02-20
Attending: EMERGENCY MEDICINE
Payer: MEDICAID

## 2025-02-20 VITALS
HEIGHT: 62 IN | WEIGHT: 225 LBS | DIASTOLIC BLOOD PRESSURE: 91 MMHG | RESPIRATION RATE: 16 BRPM | OXYGEN SATURATION: 97 % | SYSTOLIC BLOOD PRESSURE: 125 MMHG | BODY MASS INDEX: 41.41 KG/M2 | TEMPERATURE: 99 F | HEART RATE: 98 BPM

## 2025-02-20 DIAGNOSIS — Z00.8 MEDICAL CLEARANCE FOR PSYCHIATRIC ADMISSION: Primary | ICD-10-CM

## 2025-02-20 DIAGNOSIS — F15.10 AMPHETAMINE ABUSE: ICD-10-CM

## 2025-02-20 LAB
ACCEPTIBLE SP GR UR QL: 1.02 (ref 1–1.03)
ALBUMIN SERPL-MCNC: 4.3 G/DL (ref 3.5–5)
ALBUMIN/GLOB SERPL: 1 RATIO (ref 1.1–2)
ALP SERPL-CCNC: 72 UNIT/L (ref 40–150)
ALT SERPL-CCNC: 55 UNIT/L (ref 0–55)
AMPHET UR QL SCN: POSITIVE
ANION GAP SERPL CALC-SCNC: 8 MEQ/L
APAP SERPL-MCNC: <3 UG/ML (ref 10–30)
AST SERPL-CCNC: 51 UNIT/L (ref 5–34)
B-HCG UR QL: NEGATIVE
BACTERIA #/AREA URNS AUTO: ABNORMAL /HPF
BARBITURATE SCN PRESENT UR: NEGATIVE
BASOPHILS # BLD AUTO: 0.04 X10(3)/MCL
BASOPHILS NFR BLD AUTO: 0.4 %
BENZODIAZ UR QL SCN: NEGATIVE
BILIRUB SERPL-MCNC: 1 MG/DL
BILIRUB UR QL STRIP.AUTO: ABNORMAL
BUN SERPL-MCNC: 16 MG/DL (ref 7–18.7)
CALCIUM SERPL-MCNC: 9.9 MG/DL (ref 8.4–10.2)
CANNABINOIDS UR QL SCN: POSITIVE
CHLORIDE SERPL-SCNC: 104 MMOL/L (ref 98–107)
CLARITY UR: CLEAR
CO2 SERPL-SCNC: 26 MMOL/L (ref 22–29)
COCAINE UR QL SCN: NEGATIVE
COLOR UR AUTO: ABNORMAL
CREAT SERPL-MCNC: 0.72 MG/DL (ref 0.55–1.02)
CREAT/UREA NIT SERPL: 22
EOSINOPHIL # BLD AUTO: 0.08 X10(3)/MCL (ref 0–0.9)
EOSINOPHIL NFR BLD AUTO: 0.8 %
ERYTHROCYTE [DISTWIDTH] IN BLOOD BY AUTOMATED COUNT: 12.1 % (ref 11.5–17)
ETHANOL SERPL-MCNC: <10 MG/DL
FENTANYL UR QL SCN: NEGATIVE
GFR SERPLBLD CREATININE-BSD FMLA CKD-EPI: >60 ML/MIN/1.73/M2
GLOBULIN SER-MCNC: 4.2 GM/DL (ref 2.4–3.5)
GLUCOSE SERPL-MCNC: 97 MG/DL (ref 74–100)
GLUCOSE UR QL STRIP: NEGATIVE
HCT VFR BLD AUTO: 38.7 % (ref 37–47)
HGB BLD-MCNC: 13.1 G/DL (ref 12–16)
HGB UR QL STRIP: NEGATIVE
IMM GRANULOCYTES # BLD AUTO: 0.01 X10(3)/MCL (ref 0–0.04)
IMM GRANULOCYTES NFR BLD AUTO: 0.1 %
KETONES UR QL STRIP: >=80
LEUKOCYTE ESTERASE UR QL STRIP: ABNORMAL
LYMPHOCYTES # BLD AUTO: 3.77 X10(3)/MCL (ref 0.6–4.6)
LYMPHOCYTES NFR BLD AUTO: 37.1 %
MCH RBC QN AUTO: 29.6 PG (ref 27–31)
MCHC RBC AUTO-ENTMCNC: 33.9 G/DL (ref 33–36)
MCV RBC AUTO: 87.6 FL (ref 80–94)
MONOCYTES # BLD AUTO: 0.7 X10(3)/MCL (ref 0.1–1.3)
MONOCYTES NFR BLD AUTO: 6.9 %
NEUTROPHILS # BLD AUTO: 5.56 X10(3)/MCL (ref 2.1–9.2)
NEUTROPHILS NFR BLD AUTO: 54.7 %
NITRITE UR QL STRIP: NEGATIVE
OPIATES UR QL SCN: NEGATIVE
PCP UR QL: NEGATIVE
PH UR STRIP: 6 [PH]
PH UR: 6 [PH] (ref 3–11)
PLATELET # BLD AUTO: 293 X10(3)/MCL (ref 130–400)
PMV BLD AUTO: 9.4 FL (ref 7.4–10.4)
POTASSIUM SERPL-SCNC: 3.5 MMOL/L (ref 3.5–5.1)
PROT SERPL-MCNC: 8.5 GM/DL (ref 6.4–8.3)
PROT UR QL STRIP: 30
RBC # BLD AUTO: 4.42 X10(6)/MCL (ref 4.2–5.4)
RBC #/AREA URNS AUTO: ABNORMAL /HPF
SODIUM SERPL-SCNC: 138 MMOL/L (ref 136–145)
SP GR UR STRIP.AUTO: 1.02 (ref 1–1.03)
SQUAMOUS #/AREA URNS AUTO: ABNORMAL /HPF
UROBILINOGEN UR STRIP-ACNC: 1
WBC # BLD AUTO: 10.16 X10(3)/MCL (ref 4.5–11.5)
WBC #/AREA URNS AUTO: ABNORMAL /HPF

## 2025-02-20 PROCEDURE — 81025 URINE PREGNANCY TEST: CPT | Performed by: EMERGENCY MEDICINE

## 2025-02-20 PROCEDURE — 80307 DRUG TEST PRSMV CHEM ANLYZR: CPT | Performed by: EMERGENCY MEDICINE

## 2025-02-20 PROCEDURE — 85025 COMPLETE CBC W/AUTO DIFF WBC: CPT | Performed by: EMERGENCY MEDICINE

## 2025-02-20 PROCEDURE — 99283 EMERGENCY DEPT VISIT LOW MDM: CPT

## 2025-02-20 PROCEDURE — 82077 ASSAY SPEC XCP UR&BREATH IA: CPT | Performed by: EMERGENCY MEDICINE

## 2025-02-20 PROCEDURE — 80143 DRUG ASSAY ACETAMINOPHEN: CPT | Performed by: EMERGENCY MEDICINE

## 2025-02-20 PROCEDURE — 25000003 PHARM REV CODE 250: Performed by: EMERGENCY MEDICINE

## 2025-02-20 PROCEDURE — 81003 URINALYSIS AUTO W/O SCOPE: CPT | Mod: 59 | Performed by: EMERGENCY MEDICINE

## 2025-02-20 PROCEDURE — 80053 COMPREHEN METABOLIC PANEL: CPT | Performed by: EMERGENCY MEDICINE

## 2025-02-20 RX ORDER — LORAZEPAM 0.5 MG/1
2 TABLET ORAL
Status: COMPLETED | OUTPATIENT
Start: 2025-02-20 | End: 2025-02-20

## 2025-02-20 RX ORDER — METOPROLOL TARTRATE 25 MG/1
25 TABLET, FILM COATED ORAL
Status: COMPLETED | OUTPATIENT
Start: 2025-02-20 | End: 2025-02-20

## 2025-02-20 RX ADMIN — METOPROLOL TARTRATE 25 MG: 25 TABLET, FILM COATED ORAL at 12:02

## 2025-02-20 RX ADMIN — LORAZEPAM 2 MG: 0.5 TABLET ORAL at 01:02

## 2025-02-20 NOTE — ED NOTES
Pt states she was voluntarily admitting herself into [facility] to detox from meth; reports [facility] sent her to ER for medical clearance d/t elevated HR.

## 2025-02-20 NOTE — ED NOTES
Called [facility] back since faxed packet showed success; states they will call us back once they receive faxed packet.

## 2025-02-20 NOTE — ED PROVIDER NOTES
"Encounter Date: 2/20/2025       History     Chief Complaint   Patient presents with    medical clearance     Patient arrived by liam went to [facility] was sent here for evaluation due to elevated heart rate . Pt reports " they should have let me calm down because I did meth at 5am". Denies SI or HI     This 25-year-old with bipolar disorder and diabetes went to [facility] this morning to be admitted for drug rehabilitation however her heart rate was high as she had done methamphetamines prior to her arrival.  She was sent here for medical clearance due to tachycardia.       Review of patient's allergies indicates:   Allergen Reactions    Palgic d [carbinoxamine-pseudoephedrine] Anaphylaxis    Topamax [topiramate] Anaphylaxis    Macrobid [nitrofurantoin monohyd/m-cryst] Nausea And Vomiting     Past Medical History:   Diagnosis Date    Anxiety     Asthma     Bipolar disorder     Depression     Diabetes mellitus, type 2      Past Surgical History:   Procedure Laterality Date    ADENOIDECTOMY      CHOLECYSTECTOMY      TONSILLECTOMY       Family History   Problem Relation Name Age of Onset    COPD Mother      Cancer Mother      Asthma Mother      Arthritis Mother      Alcohol abuse Mother      Hypertension Father      COPD Father      Cancer Father      Asthma Father      Arthritis Father      Alcohol abuse Father       Social History[1]  Review of Systems   Constitutional:  Negative for fever.   HENT:  Negative for sore throat.    Respiratory:  Negative for shortness of breath.    Cardiovascular:  Positive for palpitations. Negative for chest pain.   Gastrointestinal:  Negative for nausea.   Genitourinary:  Negative for dysuria.   Musculoskeletal:  Negative for back pain.   Skin:  Negative for rash.   Neurological:  Negative for weakness.   Hematological:  Does not bruise/bleed easily.       Physical Exam     Initial Vitals [02/20/25 1112]   BP Pulse Resp Temp SpO2   132/71 (!) 113 20 98.5 °F (36.9 °C) 98 %      MAP     "   --         Physical Exam    Nursing note and vitals reviewed.  Constitutional: She appears well-developed and well-nourished.   HENT:   Head: Normocephalic and atraumatic.   Eyes: Conjunctivae and EOM are normal. Pupils are equal, round, and reactive to light.   Neck: Neck supple.   Normal range of motion.  Cardiovascular:  Normal rate, regular rhythm, normal heart sounds and intact distal pulses.           Pulmonary/Chest: Breath sounds normal.   Abdominal: Abdomen is soft. Bowel sounds are normal.   Musculoskeletal:         General: Normal range of motion.      Cervical back: Normal range of motion and neck supple.     Neurological: She is alert and oriented to person, place, and time. She has normal strength.   Skin: Skin is warm and dry. Capillary refill takes less than 2 seconds.   Psychiatric: She has a normal mood and affect. Her behavior is normal. Judgment and thought content normal.         ED Course   Procedures  Labs Reviewed   COMPREHENSIVE METABOLIC PANEL - Abnormal       Result Value    Sodium 138      Potassium 3.5      Chloride 104      CO2 26      Glucose 97      Blood Urea Nitrogen 16.0      Creatinine 0.72      Calcium 9.9      Protein Total 8.5 (*)     Albumin 4.3      Globulin 4.2 (*)     Albumin/Globulin Ratio 1.0 (*)     Bilirubin Total 1.0      ALP 72      ALT 55      AST 51 (*)     eGFR >60      Anion Gap 8.0      BUN/Creatinine Ratio 22     URINALYSIS, REFLEX TO URINE CULTURE - Abnormal    Color, UA Red (*)     Appearance, UA Clear      Specific Gravity, UA 1.025      pH, UA 6.0      Protein, UA 30 (*)     Glucose, UA Negative      Ketones, UA >=80 (*)     Blood, UA Negative      Bilirubin, UA Moderate (*)     Urobilinogen, UA 1.0      Nitrites, UA Negative      Leukocyte Esterase, UA Moderate (*)    DRUG SCREEN, URINE (BEAKER) - Abnormal    Amphetamines, Urine Positive (*)     Barbiturates, Urine Negative      Benzodiazepine, Urine Negative      Cannabinoids, Urine Positive (*)      Cocaine, Urine Negative      Opiates, Urine Negative      Phencyclidine, Urine Negative      Fentanyl, Urine Negative      pH, Urine 6.0      Specific Gravity, Urine Auto 1.025      Narrative:     Cut off concentrations:    Amphetamines - 1000 ng/ml  Barbiturates - 200 ng/ml  Benzodiazepine - 200 ng/ml  Cannabinoids (THC) - 50 ng/ml  Cocaine - 300 ng/ml  Fentanyl - 1.0 ng/ml  MDMA - 500 ng/ml  Opiates - 300 ng/ml   Phencyclidine (PCP) - 25 ng/ml    Specimen submitted for drug analysis and tested for pH and specific gravity in order to evaluate sample integrity. Suspect tampering if specific gravity is <1.003 and/or pH is not within the range of 4.5 - 8.0  False negatives may result form substances such as bleach added to urine.  False positives may result for the presence of a substance with similar chemical structure to the drug or its metabolite.    This test provides only a PRELIMINARY analytical test result. A more specific alternate chemical method must be used in order to obtain a confirmed analytical result. Gas chromatography/mass spectrometry (GC/MS) is the preferred confirmatory method. Other chemical confirmation methods are available. Clinical consideration and professional judgement should be applied to any drug of abuse test result, particularly when preliminary positive results are used.    Positive results will be confirmed only at the physicians request. Unconfirmed screening results are to be used only for medical purposes (treatment).        ACETAMINOPHEN LEVEL - Abnormal    Acetaminophen Level <3.0 (*)    URINALYSIS, MICROSCOPIC - Abnormal    Bacteria, UA Rare      RBC, UA None Seen      WBC, UA 3-5      Squamous Epithelial Cells, UA Few (*)    ALCOHOL,MEDICAL (ETHANOL) - Normal    Ethanol Level <10.0     PREGNANCY TEST, URINE RAPID - Normal    hCG Qualitative, Urine Negative     CBC W/ AUTO DIFFERENTIAL    Narrative:     The following orders were created for panel order CBC auto  differential.  Procedure                               Abnormality         Status                     ---------                               -----------         ------                     CBC with Differential[[id number]]                           Final result                 Please view results for these tests on the individual orders.   CBC WITH DIFFERENTIAL    WBC 10.16      RBC 4.42      Hgb 13.1      Hct 38.7      MCV 87.6      MCH 29.6      MCHC 33.9      RDW 12.1      Platelet 293      MPV 9.4      Neut % 54.7      Lymph % 37.1      Mono % 6.9      Eos % 0.8      Basophil % 0.4      Imm Grans % 0.1      Neut # 5.56      Lymph # 3.77      Mono # 0.70      Eos # 0.08      Baso # 0.04      Imm Gran # 0.01            Imaging Results    None          Medications   metoprolol tartrate (LOPRESSOR) tablet 25 mg (has no administration in time range)     Medical Decision Making  Amount and/or Complexity of Data Reviewed  Labs: ordered.    Risk  Prescription drug management.                                      Clinical Impression:  Final diagnoses:  [Z00.8] Medical clearance for psychiatric admission (Primary)  [F15.10] Amphetamine abuse          ED Disposition Condition    Discharge Stable          ED Prescriptions    None       Follow-up Information    None              [1]   Social History  Tobacco Use    Smoking status: Every Day     Types: Cigarettes, Vaping with nicotine    Smokeless tobacco: Never   Substance Use Topics    Alcohol use: Never    Drug use: Yes     Types: Marijuana        Vikram Mcclain MD  02/20/25 [id number]

## 2025-02-20 NOTE — ED NOTES
Faxed chart to [facility]; [facility] states they will call us back once they receive. Release of consent for information complete.

## 2025-03-17 ENCOUNTER — HOSPITAL ENCOUNTER (EMERGENCY)
Facility: HOSPITAL | Age: 25
Discharge: HOME | End: 2025-03-17
Attending: EMERGENCY MEDICINE
Payer: MEDICAID

## 2025-03-17 VITALS
TEMPERATURE: 98 F | WEIGHT: 227.38 LBS | RESPIRATION RATE: 18 BRPM | SYSTOLIC BLOOD PRESSURE: 136 MMHG | HEIGHT: 62 IN | OXYGEN SATURATION: 97 % | HEART RATE: 91 BPM | BODY MASS INDEX: 41.84 KG/M2 | DIASTOLIC BLOOD PRESSURE: 88 MMHG

## 2025-03-17 DIAGNOSIS — K52.9 GASTROENTERITIS: Primary | ICD-10-CM

## 2025-03-17 LAB
ALBUMIN SERPL-MCNC: 4.6 G/DL (ref 3.5–5)
ALBUMIN/GLOB SERPL: 1.1 RATIO (ref 1.1–2)
ALP SERPL-CCNC: 73 UNIT/L (ref 40–150)
ALT SERPL-CCNC: 42 UNIT/L (ref 0–55)
ANION GAP SERPL CALC-SCNC: 10 MEQ/L
AST SERPL-CCNC: 36 UNIT/L (ref 5–34)
B-HCG UR QL: NEGATIVE
BACTERIA #/AREA URNS AUTO: ABNORMAL /HPF
BASOPHILS # BLD AUTO: 0.03 X10(3)/MCL
BASOPHILS NFR BLD AUTO: 0.2 %
BILIRUB SERPL-MCNC: 0.8 MG/DL
BILIRUB UR QL STRIP.AUTO: ABNORMAL
BUN SERPL-MCNC: 16.5 MG/DL (ref 7–18.7)
CALCIUM SERPL-MCNC: 9.9 MG/DL (ref 8.4–10.2)
CHLORIDE SERPL-SCNC: 105 MMOL/L (ref 98–107)
CLARITY UR: ABNORMAL
CO2 SERPL-SCNC: 24 MMOL/L (ref 22–29)
COLOR UR AUTO: ABNORMAL
CREAT SERPL-MCNC: 0.67 MG/DL (ref 0.55–1.02)
CREAT/UREA NIT SERPL: 25
EOSINOPHIL # BLD AUTO: 0.06 X10(3)/MCL (ref 0–0.9)
EOSINOPHIL NFR BLD AUTO: 0.4 %
ERYTHROCYTE [DISTWIDTH] IN BLOOD BY AUTOMATED COUNT: 12.4 % (ref 11.5–17)
GFR SERPLBLD CREATININE-BSD FMLA CKD-EPI: >60 ML/MIN/1.73/M2
GLOBULIN SER-MCNC: 4.3 GM/DL (ref 2.4–3.5)
GLUCOSE SERPL-MCNC: 126 MG/DL (ref 74–100)
GLUCOSE UR QL STRIP: NEGATIVE
HCT VFR BLD AUTO: 40.8 % (ref 37–47)
HGB BLD-MCNC: 13.6 G/DL (ref 12–16)
HGB UR QL STRIP: NEGATIVE
IMM GRANULOCYTES # BLD AUTO: 0.04 X10(3)/MCL (ref 0–0.04)
IMM GRANULOCYTES NFR BLD AUTO: 0.3 %
KETONES UR QL STRIP: >=80
LEUKOCYTE ESTERASE UR QL STRIP: NEGATIVE
LIPASE SERPL-CCNC: 5 U/L
LYMPHOCYTES # BLD AUTO: 3.73 X10(3)/MCL (ref 0.6–4.6)
LYMPHOCYTES NFR BLD AUTO: 27.3 %
MCH RBC QN AUTO: 29.9 PG (ref 27–31)
MCHC RBC AUTO-ENTMCNC: 33.3 G/DL (ref 33–36)
MCV RBC AUTO: 89.7 FL (ref 80–94)
MONOCYTES # BLD AUTO: 0.81 X10(3)/MCL (ref 0.1–1.3)
MONOCYTES NFR BLD AUTO: 5.9 %
MUCOUS THREADS URNS QL MICRO: ABNORMAL /LPF
NEUTROPHILS # BLD AUTO: 8.98 X10(3)/MCL (ref 2.1–9.2)
NEUTROPHILS NFR BLD AUTO: 65.9 %
NITRITE UR QL STRIP: NEGATIVE
PH UR STRIP: 6 [PH]
PLATELET # BLD AUTO: 315 X10(3)/MCL (ref 130–400)
PMV BLD AUTO: 9.1 FL (ref 7.4–10.4)
POTASSIUM SERPL-SCNC: 3.6 MMOL/L (ref 3.5–5.1)
PROT SERPL-MCNC: 8.9 GM/DL (ref 6.4–8.3)
PROT UR QL STRIP: 30
RBC # BLD AUTO: 4.55 X10(6)/MCL (ref 4.2–5.4)
RBC #/AREA URNS AUTO: ABNORMAL /HPF
SODIUM SERPL-SCNC: 139 MMOL/L (ref 136–145)
SP GR UR STRIP.AUTO: 1.02 (ref 1–1.03)
SQUAMOUS #/AREA URNS AUTO: ABNORMAL /HPF
UROBILINOGEN UR STRIP-ACNC: 1
WBC # BLD AUTO: 13.65 X10(3)/MCL (ref 4.5–11.5)
WBC #/AREA URNS AUTO: ABNORMAL /HPF

## 2025-03-17 PROCEDURE — 96361 HYDRATE IV INFUSION ADD-ON: CPT

## 2025-03-17 PROCEDURE — 83690 ASSAY OF LIPASE: CPT | Performed by: NURSE PRACTITIONER

## 2025-03-17 PROCEDURE — 80053 COMPREHEN METABOLIC PANEL: CPT | Performed by: NURSE PRACTITIONER

## 2025-03-17 PROCEDURE — 96375 TX/PRO/DX INJ NEW DRUG ADDON: CPT

## 2025-03-17 PROCEDURE — 96372 THER/PROPH/DIAG INJ SC/IM: CPT | Performed by: NURSE PRACTITIONER

## 2025-03-17 PROCEDURE — 85025 COMPLETE CBC W/AUTO DIFF WBC: CPT | Performed by: NURSE PRACTITIONER

## 2025-03-17 PROCEDURE — 99284 EMERGENCY DEPT VISIT MOD MDM: CPT | Mod: 25

## 2025-03-17 PROCEDURE — 25000003 PHARM REV CODE 250: Performed by: NURSE PRACTITIONER

## 2025-03-17 PROCEDURE — 96374 THER/PROPH/DIAG INJ IV PUSH: CPT

## 2025-03-17 PROCEDURE — 63600175 PHARM REV CODE 636 W HCPCS: Performed by: NURSE PRACTITIONER

## 2025-03-17 PROCEDURE — 81003 URINALYSIS AUTO W/O SCOPE: CPT | Performed by: NURSE PRACTITIONER

## 2025-03-17 PROCEDURE — 81025 URINE PREGNANCY TEST: CPT | Performed by: NURSE PRACTITIONER

## 2025-03-17 RX ORDER — DICYCLOMINE HYDROCHLORIDE 10 MG/ML
20 INJECTION INTRAMUSCULAR
Status: COMPLETED | OUTPATIENT
Start: 2025-03-17 | End: 2025-03-17

## 2025-03-17 RX ORDER — METOCLOPRAMIDE HYDROCHLORIDE 5 MG/ML
10 INJECTION INTRAMUSCULAR; INTRAVENOUS
Status: COMPLETED | OUTPATIENT
Start: 2025-03-17 | End: 2025-03-17

## 2025-03-17 RX ORDER — ONDANSETRON 4 MG/1
4 TABLET, ORALLY DISINTEGRATING ORAL EVERY 8 HOURS PRN
Qty: 4 TABLET | Refills: 0 | Status: SHIPPED | OUTPATIENT
Start: 2025-03-17

## 2025-03-17 RX ORDER — DICYCLOMINE HYDROCHLORIDE 20 MG/1
20 TABLET ORAL 3 TIMES DAILY PRN
Qty: 15 TABLET | Refills: 0 | Status: SHIPPED | OUTPATIENT
Start: 2025-03-17 | End: 2025-03-22

## 2025-03-17 RX ORDER — ONDANSETRON HYDROCHLORIDE 2 MG/ML
4 INJECTION, SOLUTION INTRAVENOUS
Status: COMPLETED | OUTPATIENT
Start: 2025-03-17 | End: 2025-03-17

## 2025-03-17 RX ADMIN — ONDANSETRON 4 MG: 2 INJECTION INTRAMUSCULAR; INTRAVENOUS at 03:03

## 2025-03-17 RX ADMIN — DICYCLOMINE HYDROCHLORIDE 20 MG: 10 INJECTION, SOLUTION INTRAMUSCULAR at 03:03

## 2025-03-17 RX ADMIN — SODIUM CHLORIDE 1000 ML: 9 INJECTION, SOLUTION INTRAVENOUS at 03:03

## 2025-03-17 RX ADMIN — METOCLOPRAMIDE 10 MG: 5 INJECTION, SOLUTION INTRAMUSCULAR; INTRAVENOUS at 04:03

## 2025-03-17 NOTE — Clinical Note
"Hank Khandasia" Karthikeyan was seen and treated in our emergency department on 3/17/2025.  She may return to work on 03/19/2025.       If you have any questions or concerns, please don't hesitate to call.      Laya Baugh FNP"

## 2025-03-17 NOTE — ED PROVIDER NOTES
"Encounter Date: 3/17/2025       History     Chief Complaint   Patient presents with    Emesis     Vomiting over the last couple days. Reports vomited 18 times. Has mid abdominal pain. Has diarrhea.     Abdominal Pain    Diarrhea     25-year-old female presents to ER complaining of epigastric abdominal pain ready eating to the left flank with nausea vomiting and diarrhea for the past couple of days.  She denies any fever or bloody stool/emesis.  She does report having a cholecystectomy "years ago".  No known sick contacts.  She denies any cough, congestion or shortness of breath.  She denies alcohol use.     The history is provided by the patient. No  was used.     Review of patient's allergies indicates:   Allergen Reactions    Carbinoxamine-pseudoephedrine Anaphylaxis     Other Reaction(s): Not available    Topamax [topiramate] Anaphylaxis    Carbinoxamine maleate      Other Reaction(s): unknown    Macrobid [nitrofurantoin monohyd/m-cryst] Nausea And Vomiting     Past Medical History:   Diagnosis Date    Anxiety     Asthma     Bipolar disorder     Depression     Diabetes mellitus, type 2      Past Surgical History:   Procedure Laterality Date    ADENOIDECTOMY      CHOLECYSTECTOMY      TONSILLECTOMY       Family History   Problem Relation Name Age of Onset    COPD Mother      Cancer Mother      Asthma Mother      Arthritis Mother      Alcohol abuse Mother      Hypertension Father      COPD Father      Cancer Father      Asthma Father      Arthritis Father      Alcohol abuse Father       Social History[1]  Review of Systems   Constitutional:  Negative for chills and fever.   HENT:  Negative for congestion.    Respiratory:  Negative for cough and shortness of breath.    Gastrointestinal:  Positive for abdominal pain, diarrhea, nausea and vomiting.   Genitourinary:  Negative for dysuria.   Neurological:  Negative for dizziness and light-headedness.   All other systems reviewed and are " negative.      Physical Exam     Initial Vitals [03/17/25 1452]   BP Pulse Resp Temp SpO2   136/88 91 18 98.1 °F (36.7 °C) 97 %      MAP       --         Physical Exam    Nursing note and vitals reviewed.  Constitutional: She appears well-developed and well-nourished.   HENT:   Head: Normocephalic.   Eyes: EOM are normal.   Neck: Neck supple.   Cardiovascular:  Normal rate and regular rhythm.           Pulmonary/Chest: Breath sounds normal. No respiratory distress.   Abdominal: Abdomen is soft. Bowel sounds are normal. She exhibits no distension. There is abdominal tenderness in the epigastric area and left upper quadrant.   Musculoskeletal:         General: Normal range of motion.      Cervical back: Neck supple.     Neurological: She is alert and oriented to person, place, and time.   Skin: Skin is warm and dry. Capillary refill takes less than 2 seconds.   Psychiatric: She has a normal mood and affect.         ED Course   Procedures  Labs Reviewed   COMPREHENSIVE METABOLIC PANEL - Abnormal       Result Value    Sodium 139      Potassium 3.6      Chloride 105      CO2 24      Glucose 126 (*)     Blood Urea Nitrogen 16.5      Creatinine 0.67      Calcium 9.9      Protein Total 8.9 (*)     Albumin 4.6      Globulin 4.3 (*)     Albumin/Globulin Ratio 1.1      Bilirubin Total 0.8      ALP 73      ALT 42      AST 36 (*)     eGFR >60      Anion Gap 10.0      BUN/Creatinine Ratio 25     URINALYSIS, REFLEX TO URINE CULTURE - Abnormal    Color, UA Dark Yellow      Appearance, UA Hazy (*)     Specific Gravity, UA 1.025      pH, UA 6.0      Protein, UA 30 (*)     Glucose, UA Negative      Ketones, UA >=80 (*)     Blood, UA Negative      Bilirubin, UA Small (*)     Urobilinogen, UA 1.0      Nitrites, UA Negative      Leukocyte Esterase, UA Negative     CBC WITH DIFFERENTIAL - Abnormal    WBC 13.65 (*)     RBC 4.55      Hgb 13.6      Hct 40.8      MCV 89.7      MCH 29.9      MCHC 33.3      RDW 12.4      Platelet 315      MPV  9.1      Neut % 65.9      Lymph % 27.3      Mono % 5.9      Eos % 0.4      Basophil % 0.2      Imm Grans % 0.3      Neut # 8.98      Lymph # 3.73      Mono # 0.81      Eos # 0.06      Baso # 0.03      Imm Gran # 0.04     URINALYSIS, MICROSCOPIC - Abnormal    Bacteria, UA None Seen      Mucous, UA Moderate (*)     RBC, UA None Seen      WBC, UA 0-2      Squamous Epithelial Cells, UA Few (*)    LIPASE - Normal    Lipase Level 5     PREGNANCY TEST, URINE RAPID - Normal    hCG Qualitative, Urine Negative     CBC W/ AUTO DIFFERENTIAL    Narrative:     The following orders were created for panel order CBC auto differential.  Procedure                               Abnormality         Status                     ---------                               -----------         ------                     CBC with Differential[[id number]]       Abnormal            Final result                 Please view results for these tests on the individual orders.          Imaging Results    None          Medications   sodium chloride 0.9% bolus 1,000 mL 1,000 mL (1,000 mLs Intravenous New Bag 3/17/25 4354)   metoclopramide injection 10 mg (has no administration in time range)   ondansetron injection 4 mg (4 mg Intravenous Given 3/17/25 1533)   dicyclomine injection 20 mg (20 mg Intramuscular Given 3/17/25 9486)     Medical Decision Making  DDX: gastroenteritis, UTI, dehydration, pancreatitis    25-year-old female presents to ER complaining of epigastric abdominal pain ready eating to the left flank with nausea vomiting and diarrhea for the past couple of days.  She denies any fever or bloody stool/emesis. Mild leukocytosis of 13.65, normal H/H. Normal electrolytes, renal function and liver enzymes. Lipase negative. Urinalysis without leukocytes, nitrites or bacteria. Abdomen soft with minimal epigastric tenderness, normal bowel sounds. No vomiting while in ER. Afebrile, nontoxic. Will discharge home with ondansetron, dicyclomine.     Amount  and/or Complexity of Data Reviewed  Labs: ordered. Decision-making details documented in ED Course.    Risk  Prescription drug management.               ED Course as of 03/17/25 1607   Mon Mar 17, 2025   1522 WBC(!): 13.65 [LN]   1522 Hemoglobin: 13.6 [LN]   1522 Hematocrit: 40.8 [LN]   1535 BUN: 16.5 [LN]   1535 Creatinine: 0.67 [LN]   1536 ALP: 73 [LN]   1536 ALT: 42 [LN]   1536 AST(!): 36 [LN]   1536 Lipase: 5 [LN]   1553 NITRITE UA: Negative [LN]   1553 Leukocyte Esterase, UA: Negative [LN]   1553 hCG Qualitative, Urine: Negative [LN]   1558 Bacteria, UA: None Seen [LN]      ED Course User Index  [LN] Laya Baugh FNP                           Clinical Impression:  Final diagnoses:  [K52.9] Gastroenteritis (Primary)          ED Disposition Condition    Discharge Stable          ED Prescriptions       Medication Sig Dispense Start Date End Date Auth. Provider    dicyclomine (BENTYL) 20 mg tablet Take 1 tablet (20 mg total) by mouth 3 (three) times daily as needed (abdominal cramping). 15 tablet 3/17/2025 3/22/2025 Laya Baugh FNP    ondansetron (ZOFRAN-ODT) 4 MG TbDL Take 1 tablet (4 mg total) by mouth every 8 (eight) hours as needed (nausea/vomiting). 4 tablet 3/17/2025 -- Laya Baugh FNP          Follow-up Information    None            [1]   Social History  Tobacco Use    Smoking status: Every Day     Current packs/day: 1.00     Types: Cigarettes, Vaping with nicotine    Smokeless tobacco: Never   Substance Use Topics    Alcohol use: Never    Drug use: Yes     Types: Marijuana        Laya Baugh FNP  03/17/25 1607

## 2025-03-17 NOTE — DISCHARGE INSTRUCTIONS
Ondansetron as needed for nausea/vomiting  Dicyclomine as needed for abdominal pain  Liquids progressing to bland diet as tolerated  Avoid greasy, spicy food

## 2025-03-27 ENCOUNTER — HOSPITAL ENCOUNTER (EMERGENCY)
Facility: HOSPITAL | Age: 25
Discharge: HOME | End: 2025-03-27
Attending: EMERGENCY MEDICINE
Payer: MEDICAID

## 2025-03-27 VITALS
DIASTOLIC BLOOD PRESSURE: 89 MMHG | BODY MASS INDEX: 40.67 KG/M2 | RESPIRATION RATE: 18 BRPM | HEIGHT: 62 IN | HEART RATE: 108 BPM | TEMPERATURE: 99 F | OXYGEN SATURATION: 99 % | SYSTOLIC BLOOD PRESSURE: 138 MMHG | WEIGHT: 221 LBS

## 2025-03-27 DIAGNOSIS — M54.2 NECK PAIN: ICD-10-CM

## 2025-03-27 DIAGNOSIS — M79.602 PAIN OF LEFT UPPER EXTREMITY: Primary | ICD-10-CM
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
## 2025-03-27 PROCEDURE — 25000003 PHARM REV CODE 250

## 2025-03-27 PROCEDURE — 99284 EMERGENCY DEPT VISIT MOD MDM: CPT | Mod: 25

## 2025-03-27 RX ORDER — LIDOCAINE 50 MG/G
1 PATCH TOPICAL DAILY
Qty: 30 PATCH | Refills: 0 | Status: SHIPPED | OUTPATIENT
Start: 2025-03-27

## 2025-03-27 RX ORDER — GABAPENTIN 100 MG/1
100 CAPSULE ORAL 3 TIMES DAILY
Qty: 90 CAPSULE | Refills: 0 | Status: SHIPPED | OUTPATIENT
Start: 2025-03-27 | End: 2025-03-27

## 2025-03-27 RX ORDER — ACETAMINOPHEN 500 MG
1000 TABLET ORAL
Status: COMPLETED | OUTPATIENT
Start: 2025-03-27 | End: 2025-03-27

## 2025-03-27 RX ORDER — METHOCARBAMOL 500 MG/1
1000 TABLET, FILM COATED ORAL
Status: COMPLETED | OUTPATIENT
Start: 2025-03-27 | End: 2025-03-27

## 2025-03-27 RX ORDER — GABAPENTIN 100 MG/1
100 CAPSULE ORAL 3 TIMES DAILY
Qty: 90 CAPSULE | Refills: 0 | Status: SHIPPED | OUTPATIENT
Start: 2025-03-27 | End: 2025-03-29

## 2025-03-27 RX ORDER — ACETAMINOPHEN 500 MG
1000 TABLET ORAL
Status: DISCONTINUED | OUTPATIENT
Start: 2025-03-27 | End: 2025-03-27

## 2025-03-27 RX ORDER — LIDOCAINE 50 MG/G
1 PATCH TOPICAL
Status: DISCONTINUED | OUTPATIENT
Start: 2025-03-27 | End: 2025-03-27 | Stop reason: HOSPADM

## 2025-03-27 RX ORDER — METHOCARBAMOL 500 MG/1
1000 TABLET, FILM COATED ORAL 3 TIMES DAILY
Qty: 30 TABLET | Refills: 0 | Status: SHIPPED | OUTPATIENT
Start: 2025-03-27 | End: 2025-03-29

## 2025-03-27 RX ADMIN — METHOCARBAMOL 1000 MG: 500 TABLET ORAL at 06:03

## 2025-03-27 RX ADMIN — ACETAMINOPHEN 1000 MG: 500 TABLET ORAL at 06:03

## 2025-03-27 RX ADMIN — LIDOCAINE 1 PATCH: 50 PATCH CUTANEOUS at 06:03

## 2025-03-27 NOTE — ED PROVIDER NOTES
Encounter Date: 3/27/2025       History     Chief Complaint   Patient presents with    Arm Pain     Fell off a ladder at work Monday.  Complaints of pain in right elbow and forearm      This 25-year-old female works at an [employer] business.  She states she fell from a ladder on Monday and struck her right arm in particular her elbow.  She presents with pain in the right elbow and she complains of numbness in her right hand.  The numbness is not confined to any particular finger but is present in all 5 fingers.  She also complains of pain in her right neck and her right trapezius muscles.         Review of patient's allergies indicates:   Allergen Reactions    Carbinoxamine-pseudoephedrine Anaphylaxis     Other Reaction(s): Not available    Topamax [topiramate] Anaphylaxis    Carbinoxamine maleate      Other Reaction(s): unknown    Macrobid [nitrofurantoin monohyd/m-cryst] Nausea And Vomiting     Past Medical History:   Diagnosis Date    Anxiety     Asthma     Bipolar disorder     Depression     Diabetes mellitus, type 2      Past Surgical History:   Procedure Laterality Date    ADENOIDECTOMY      CHOLECYSTECTOMY      TONSILLECTOMY       Family History   Problem Relation Name Age of Onset    COPD Mother      Cancer Mother      Asthma Mother      Arthritis Mother      Alcohol abuse Mother      Hypertension Father      COPD Father      Cancer Father      Asthma Father      Arthritis Father      Alcohol abuse Father       Social History[1]  Review of Systems   Constitutional:  Negative for fever.   HENT:  Negative for sore throat.    Respiratory:  Negative for shortness of breath.    Cardiovascular:  Negative for chest pain.   Gastrointestinal:  Negative for nausea.   Genitourinary:  Negative for dysuria.   Musculoskeletal:  Negative for back pain.   Skin:  Negative for rash.   Neurological:  Negative for weakness.   Hematological:  Does not bruise/bleed easily.       Physical Exam     Initial Vitals [03/27/25 1746]    BP Pulse Resp Temp SpO2   138/89 108 18 98.6 °F (37 °C) 99 %      MAP       --         Physical Exam    Nursing note and vitals reviewed.  Constitutional: She appears well-developed and well-nourished.   HENT:   Head: Normocephalic and atraumatic.   Eyes: Conjunctivae and EOM are normal. Pupils are equal, round, and reactive to light.   Neck: Neck supple.   Normal range of motion.  Cardiovascular:  Normal rate, regular rhythm, normal heart sounds and intact distal pulses.           Pulmonary/Chest: Breath sounds normal.   Abdominal: Abdomen is soft. Bowel sounds are normal.   Musculoskeletal:         General: Tenderness (right trapezius and right elbow) present. Normal range of motion.      Cervical back: Normal range of motion and neck supple.     Neurological: She is alert and oriented to person, place, and time. She has normal strength.   Skin: Skin is warm and dry. Capillary refill takes less than 2 seconds.   Psychiatric: She has a normal mood and affect. Her behavior is normal. Judgment and thought content normal.         ED Course   Procedures  Labs Reviewed - No data to display       Imaging Results              X-Ray Elbow Complete Right (Final result)  Result time 03/27/25 18:29:09      Final result by Vj Cosby MD (03/27/25 18:29:09)                   Impression:      No acute osseous process identified.      Electronically signed by: Vj Cosby  Date:    03/27/2025  Time:    18:29               Narrative:    EXAMINATION:  XR ELBOW COMPLETE 3 VIEW RIGHT    CLINICAL HISTORY:  . Unspecified fall, initial encounter    TECHNIQUE:  AP, lateral, and oblique views of the right elbow    COMPARISON:  None    FINDINGS:  No acute fracture identified.  Joint alignments are maintained.  No definitive elbow effusion.                                       X-Ray Cervical Spine AP And Lateral (Final result)  Result time 03/27/25 18:20:53      Final result by Chris Hyman MD (03/27/25 18:20:53)                    Impression:      No abnormality seen      Electronically signed by: Philip Hyman  Date:    03/27/2025  Time:    18:20               Narrative:    EXAMINATION:  XR CERVICAL SPINE AP LATERAL    CLINICAL HISTORY:  Unspecified fall, initial encounter    TECHNIQUE:  AP, lateral and open mouth views of the cervical spine were performed.    COMPARISON:  None    FINDINGS:  The vertebral body heights and alignment are well maintained.  No fracture is seen.  No dislocation is seen.  Odontoid lateral masses appear grossly unremarkable.  No soft tissue abnormality is seen.                                       Medications   LIDOcaine 5 % patch 1 patch (1 patch Transdermal Patch Applied 3/27/25 1845)   methocarbamoL tablet 1,000 mg (1,000 mg Oral Given 3/27/25 1845)   acetaminophen tablet 1,000 mg (1,000 mg Oral Given 3/27/25 1845)     Medical Decision Making  Amount and/or Complexity of Data Reviewed  Radiology: ordered.    Risk  OTC drugs.  Prescription drug management.               ED Course as of 03/27/25 1859   Thu Mar 27, 2025   1858 Patient was updated on results, she is agreeable with discharge plan, notes she ran out of her gabapentin prescription, refill ordered for the patient.  Advised to use warm and cold compresses and follow up with orthopedic doctor should her symptoms persist.  Advised to return to the ER for any new or worsening symptoms. [KJ]      ED Course User Index  [KJ] Mandi Stiles DO                           Clinical Impression:  Final diagnoses:  [W19.XXXA] Fall  [M79.602] Pain of left upper extremity (Primary)  [M54.2] Neck pain                   [1]   Social History  Tobacco Use    Smoking status: Every Day     Current packs/day: 1.00     Types: Cigarettes, Vaping with nicotine    Smokeless tobacco: Never   Substance Use Topics    Alcohol use: Never    Drug use: Yes     Types: Marijuana        Mandi Stiles DO  03/27/25 1859

## 2025-03-27 NOTE — DISCHARGE INSTRUCTIONS
You came into the emergency department today due to neck pain as well as arm pain.  Your x-rays did not show any abnormalities.  Please follow-up with your primary care provider and follow-up with orthopedic doctor should your symptoms persist.  Return to the ER for any new or worsening symptoms.

## 2025-03-27 NOTE — Clinical Note
"Hank Khandasia" Karthikeyan was seen and treated in our emergency department on 3/27/2025.  She may return to work on 03/28/2025.       If you have any questions or concerns, please don't hesitate to call.      Mariela Randhawa RN    "

## 2025-03-29 ENCOUNTER — HOSPITAL ENCOUNTER (EMERGENCY)
Facility: HOSPITAL | Age: 25
Discharge: HOME | End: 2025-03-29
Attending: FAMILY MEDICINE
Payer: MEDICAID

## 2025-03-29 VITALS
OXYGEN SATURATION: 98 % | HEIGHT: 62 IN | TEMPERATURE: 99 F | WEIGHT: 222 LBS | BODY MASS INDEX: 40.85 KG/M2 | RESPIRATION RATE: 20 BRPM | SYSTOLIC BLOOD PRESSURE: 165 MMHG | HEART RATE: 117 BPM | DIASTOLIC BLOOD PRESSURE: 109 MMHG

## 2025-03-29 DIAGNOSIS — F31.9 BIPOLAR AFFECTIVE DISORDER, REMISSION STATUS UNSPECIFIED: ICD-10-CM

## 2025-03-29 DIAGNOSIS — Z76.0 MEDICATION REFILL: Primary | ICD-10-CM

## 2025-03-29 PROCEDURE — 99281 EMR DPT VST MAYX REQ PHY/QHP: CPT

## 2025-03-29 RX ORDER — METHOCARBAMOL 500 MG/1
1000 TABLET, FILM COATED ORAL 3 TIMES DAILY
Qty: 18 TABLET | Refills: 0 | Status: SHIPPED | OUTPATIENT
Start: 2025-03-29 | End: 2025-04-01

## 2025-03-29 RX ORDER — CLINDAMYCIN HYDROCHLORIDE 150 MG/1
300 CAPSULE ORAL 4 TIMES DAILY
Qty: 40 CAPSULE | Refills: 0 | Status: SHIPPED | OUTPATIENT
Start: 2025-03-29 | End: 2025-04-03

## 2025-03-29 RX ORDER — GABAPENTIN 100 MG/1
400 CAPSULE ORAL NIGHTLY
Qty: 12 CAPSULE | Refills: 0 | Status: SHIPPED | OUTPATIENT
Start: 2025-03-29 | End: 2025-04-01

## 2025-03-29 RX ORDER — QUETIAPINE FUMARATE 100 MG/1
400 TABLET, FILM COATED ORAL NIGHTLY
Qty: 12 TABLET | Refills: 0 | Status: SHIPPED | OUTPATIENT
Start: 2025-03-29 | End: 2025-04-01

## 2025-03-29 RX ORDER — GABAPENTIN 100 MG/1
100 CAPSULE ORAL 3 TIMES DAILY
Qty: 9 CAPSULE | Refills: 0 | Status: SHIPPED | OUTPATIENT
Start: 2025-03-29 | End: 2025-04-01

## 2025-03-29 NOTE — Clinical Note
"Hank Khandasia" Karthikeyan was seen and treated in our emergency department on 3/29/2025.  She may return to work on 03/30/2025.       If you have any questions or concerns, please don't hesitate to call.      Mariela Randhawa RN    "
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
## 2025-04-30 ENCOUNTER — HOSPITAL ENCOUNTER (EMERGENCY)
Facility: HOSPITAL | Age: 25
Discharge: HOME | End: 2025-04-30
Attending: EMERGENCY MEDICINE
Payer: MEDICAID

## 2025-04-30 VITALS
SYSTOLIC BLOOD PRESSURE: 146 MMHG | BODY MASS INDEX: 38.41 KG/M2 | DIASTOLIC BLOOD PRESSURE: 74 MMHG | TEMPERATURE: 98 F | OXYGEN SATURATION: 98 % | WEIGHT: 210 LBS | RESPIRATION RATE: 18 BRPM | HEART RATE: 92 BPM
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
## 2025-04-30 PROCEDURE — 25000003 PHARM REV CODE 250: Performed by: EMERGENCY MEDICINE

## 2025-04-30 PROCEDURE — 99284 EMERGENCY DEPT VISIT MOD MDM: CPT | Mod: 25

## 2025-04-30 RX ORDER — ACETAMINOPHEN 325 MG/1
650 TABLET ORAL
Status: COMPLETED | OUTPATIENT
Start: 2025-04-30 | End: 2025-04-30

## 2025-04-30 RX ADMIN — ACETAMINOPHEN 650 MG: 325 TABLET ORAL at 03:04
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
## 2025-04-30 NOTE — Clinical Note
"Hank Pembertoneleazar Napier was seen and treated in our emergency department on 4/30/2025.  She may return to work on 05/01/2025.       If you have any questions or concerns, please don't hesitate to call.      Vikram Mcclain MD"

## 2025-06-04 ENCOUNTER — HOSPITAL ENCOUNTER (EMERGENCY)
Facility: HOSPITAL | Age: 25
Discharge: HOME | End: 2025-06-04
Attending: EMERGENCY MEDICINE
Payer: MEDICAID

## 2025-06-04 VITALS
RESPIRATION RATE: 16 BRPM | TEMPERATURE: 98 F | SYSTOLIC BLOOD PRESSURE: 131 MMHG | HEIGHT: 62 IN | BODY MASS INDEX: 37.48 KG/M2 | HEART RATE: 84 BPM | DIASTOLIC BLOOD PRESSURE: 80 MMHG | WEIGHT: 203.69 LBS | OXYGEN SATURATION: 94 %

## 2025-06-04 DIAGNOSIS — R05.9 COUGH: Primary | ICD-10-CM

## 2025-06-04 PROCEDURE — 99283 EMERGENCY DEPT VISIT LOW MDM: CPT | Mod: 25

## 2025-06-04 RX ORDER — PROMETHAZINE HYDROCHLORIDE AND DEXTROMETHORPHAN HYDROBROMIDE 6.25; 15 MG/5ML; MG/5ML
5 SYRUP ORAL EVERY 4 HOURS PRN
Qty: 118 ML | Refills: 0 | Status: SHIPPED | OUTPATIENT
Start: 2025-06-04 | End: 2025-06-14

## 2025-06-04 NOTE — Clinical Note
"Hank Covarrubias" Karthikeyan was seen and treated in our emergency department on 6/4/2025.  She may return to work on 06/05/2025.       If you have any questions or concerns, please don't hesitate to call.       RN    "

## 2025-06-04 NOTE — ED PROVIDER NOTES
NAME:  Hank Napier  CSN:     [id number]  MRN:    [id number]  ADMIT DATE: 6/4/2025        eMERGENCY dEPARTMENT eNCOUnter    CHIEF COMPLAINT    Chief Complaint   Patient presents with    Fall     Pt presents with c/o fall yesterday, hit left shin and hit head; no LOC, but states she has had a headache since; also reports she has been congested for over 1wk; seen at  dx with bronchitis, given steroids and antibiotics, however she does not feel it is helping        HPI      Hank Napire is a 25 y.o. female who presents to the ED for evaluation of a cough ongoing for the last 2 weeks.  Patient reports she has completed a course of steroids and antibiotics but continues to experience cough.  She has a history of asthma.  She is also a smoker.  Patient also complains of a fall yesterday that resulted in bruising to the left shin.  She is able to ambulate without difficulty.  No other injuries.          ALLERGIES    Review of patient's allergies indicates:   Allergen Reactions    Carbinoxamine-pseudoephedrine Anaphylaxis     Other Reaction(s): Not available    Topamax [topiramate] Anaphylaxis    Carbinoxamine maleate      Other Reaction(s): unknown    Macrobid [nitrofurantoin monohyd/m-cryst] Nausea And Vomiting       PAST MEDICAL HISTORY  Past Medical History:   Diagnosis Date    Anxiety     Asthma     Bipolar disorder     Depression     Diabetes mellitus, type 2        SURGICAL HISTORY    Past Surgical History:   Procedure Laterality Date    ADENOIDECTOMY      CHOLECYSTECTOMY      TONSILLECTOMY         SOCIAL HISTORY    Social History     Socioeconomic History    Marital status: Single   Tobacco Use    Smoking status: Every Day     Current packs/day: 1.00     Types: Cigarettes, Vaping with nicotine    Smokeless tobacco: Never   Substance and Sexual Activity    Alcohol use: Never    Drug use: Yes     Types: Marijuana    Sexual activity: Yes     Partners: Female       FAMILY HISTORY    Family History   Problem  "Relation Name Age of Onset    COPD Mother      Cancer Mother      Asthma Mother      Arthritis Mother      Alcohol abuse Mother      Hypertension Father      COPD Father      Cancer Father      Asthma Father      Arthritis Father      Alcohol abuse Father         REVIEW OF SYSTEMS   ROS  All Systems otherwise negative except as noted in the History of Present Illness.        PHYSICAL EXAM    Reviewed Triage Note  VITAL SIGNS:   ED Triage Vitals [06/04/25 1242]   Encounter Vitals Group      /80      Systolic BP Percentile       Diastolic BP Percentile       Pulse 100      Resp 16      Temp 98.3 °F (36.8 °C)      Temp Source Oral      SpO2 (!) 94 %      Weight 203 lb 11.3 oz      Height 5' 2"      Head Circumference       Peak Flow       Pain Score       Pain Loc       Pain Education       Exclude from Growth Chart        Patient Vitals for the past 24 hrs:   BP Temp Temp src Pulse Resp SpO2 Height Weight   06/04/25 1503 -- -- -- 84 -- -- -- --   06/04/25 1242 131/80 98.3 °F (36.8 °C) Oral 100 16 (!) 94 % 5' 2" (1.575 m) 92.4 kg (203 lb 11.3 oz)           Physical Exam    Constitutional:  Well-developed, well-nourished. No acute distress  HENT:  Normocephalic, atraumatic.  Eyes:  EOMI. Conjunctiva normal without discharge.   Neck: Normal range of motion.No stridor. No meningismus.   Respiratory:  Normal breath sounds bilaterally.  No respiratory distress, retractions, or conversational dyspnea. No wheezing. No rhonchi. No rales.   Cardiovascular:  Normal heart rate. Normal rhythm. No pitting lower extremity edema.   GI:  Abdomen soft, non-distended, non-tender. Normal bowel sounds. No guarding, rigidity or rebound.    : No CVA tenderness.   Musculoskeletal:  No gross deformity or limited range of motion of all major joints. No palpable bony deformity. No tenderness to palpation.  Integument:  Ecchymosis to the left anterior shin   Neurologic:  Normal motor function. Normal sensory function. No focal deficits " noted. Alert and Interactive.  Psychiatric:  Affect normal. Mood normal.         LABS  Pertinent labs reviewed. (See chart for details)   Labs Reviewed - No data to display      RADIOLOGY    Imaging Results              X-Ray Chest PA And Lateral (Final result)  Result time 06/04/25 15:14:39      Final result by Snow Ny MD (06/04/25 15:14:39)                   Impression:      No acute cardiopulmonary abnormality.      Electronically signed by: Snow Ny  Date:    06/04/2025  Time:    15:14               Narrative:    EXAMINATION:  XR CHEST PA AND LATERAL    CLINICAL HISTORY:  Cough, unspecified    TECHNIQUE:  Two views of the chest    COMPARISON:  02/06/2013    FINDINGS:  LINES AND TUBES: None    MEDIASTINUM AND SANDRA: The cardiac silhouette is normal.    LUNGS: No lobar consolidation. No edema.    PLEURA:No pleural effusion. No pneumothorax.    BONES: No acute osseous abnormality.                                      PROCEDURES    Procedures      EKG     Interpreted by ERP:          ED COURSE & MEDICAL DECISION MAKING    Pertinent & Imaging studies reviewed. (See chart for details and specific orders.)        Medications - No data to display       Chest x-ray negative for pneumonia.  Patient will be discharged with a cough suppressant and advised to follow up with her PCP       DISPOSITION  Patient discharged in stable condition at No discharge date for patient encounter.      DISCHARGE INSTRUCTIONS & MEDS       Medication List        START taking these medications      promethazine-dextromethorphan 6.25-15 mg/5 mL Syrp  Commonly known as: PROMETHAZINE-DM  Take 5 mLs by mouth every 4 (four) hours as needed (cough).            ASK your doctor about these medications      blood-glucose meter kit  Use as instructed     gabapentin 100 MG capsule  Commonly known as: NEURONTIN  Take 4 capsules (400 mg total) by mouth every evening. for 3 days     hydrOXYzine pamoate 25 MG Cap  Commonly known as:  VISTARIL  Take 1 capsule (25 mg total) by mouth every 8 (eight) hours as needed (anxiety).     LIDOcaine 5 %  Commonly known as: LIDODERM  Place 1 patch onto the skin once daily. Remove & Discard patch within 12 hours or as directed by MD     metFORMIN 500 MG tablet  Commonly known as: GLUCOPHAGE  Take 1 tablet (500 mg total) by mouth 2 (two) times daily with meals.     ondansetron 4 MG Tbdl  Commonly known as: ZOFRAN-ODT  Take 1 tablet (4 mg total) by mouth every 8 (eight) hours as needed (nausea/vomiting).     QUEtiapine 100 MG Tab  Commonly known as: SEROQUEL  Take 4 tablets (400 mg total) by mouth every evening. for 3 days     sumatriptan 25 MG Tab  Commonly known as: IMITREX  Take 2 tablets (50 mg total) by mouth daily as needed (migraine).               Where to Get Your Medications        These medications were sent to [facility] #[id number] - [address] [address]      Phone: [phone]   promethazine-dextromethorphan 6.25-15 mg/5 mL Syrp           New Prescriptions    PROMETHAZINE-DEXTROMETHORPHAN (PROMETHAZINE-DM) 6.25-15 MG/5 ML SYRP    Take 5 mLs by mouth every 4 (four) hours as needed (cough).           FINAL IMPRESSION    1. Cough              Blood Pressure Follow-Up Advised  Patient advised to follow up with PCP within 3-5 days for blood pressure re-check if blood pressure is equal to or greater than 120/80.         Critical care time spent with this patient (not including separately billable items) was  0 minutes.     DISCLAIMER: This note was prepared with Dragon NaturallySpeaking voice recognition transcription software. Garbled syntax, mangled pronouns, and other bizarre constructions may be attributed to that software system.      Lei Singh MD  06/04/2025  3:43 PM           Lei Singh MD  06/04/25 1882